# Patient Record
Sex: FEMALE | Race: WHITE | NOT HISPANIC OR LATINO | Employment: UNEMPLOYED | URBAN - METROPOLITAN AREA
[De-identification: names, ages, dates, MRNs, and addresses within clinical notes are randomized per-mention and may not be internally consistent; named-entity substitution may affect disease eponyms.]

---

## 2018-09-20 ENCOUNTER — APPOINTMENT (OUTPATIENT)
Dept: WOMENS IMAGING | Facility: HOSPITAL | Age: 43
End: 2018-09-20

## 2018-09-20 PROCEDURE — 77067 SCR MAMMO BI INCL CAD: CPT | Performed by: RADIOLOGY

## 2022-05-06 ENCOUNTER — OFFICE VISIT (OUTPATIENT)
Dept: FAMILY MEDICINE CLINIC | Facility: CLINIC | Age: 47
End: 2022-05-06

## 2022-05-06 VITALS
SYSTOLIC BLOOD PRESSURE: 122 MMHG | WEIGHT: 188.5 LBS | DIASTOLIC BLOOD PRESSURE: 80 MMHG | BODY MASS INDEX: 31.4 KG/M2 | HEART RATE: 65 BPM | HEIGHT: 65 IN | TEMPERATURE: 97.3 F | OXYGEN SATURATION: 99 %

## 2022-05-06 DIAGNOSIS — F33.2 SEVERE EPISODE OF RECURRENT MAJOR DEPRESSIVE DISORDER, WITHOUT PSYCHOTIC FEATURES: Primary | ICD-10-CM

## 2022-05-06 PROBLEM — R10.9 ABDOMINAL PAIN: Status: ACTIVE | Noted: 2022-05-06

## 2022-05-06 PROBLEM — E55.9 AVITAMINOSIS D: Status: ACTIVE | Noted: 2022-05-06

## 2022-05-06 PROBLEM — F41.9 ANXIETY AND DEPRESSION: Status: ACTIVE | Noted: 2022-05-06

## 2022-05-06 PROBLEM — F32.A ANXIETY AND DEPRESSION: Status: ACTIVE | Noted: 2022-05-06

## 2022-05-06 PROBLEM — D50.9 ANEMIA, IRON DEFICIENCY: Status: ACTIVE | Noted: 2022-05-06

## 2022-05-06 PROBLEM — H57.02 ANISOCORIA: Status: ACTIVE | Noted: 2022-05-06

## 2022-05-06 PROBLEM — E66.9 ADIPOSITY: Status: ACTIVE | Noted: 2022-05-06

## 2022-05-06 PROCEDURE — 99214 OFFICE O/P EST MOD 30 MIN: CPT | Performed by: FAMILY MEDICINE

## 2022-05-06 RX ORDER — BUPROPION HYDROCHLORIDE 150 MG/1
150 TABLET ORAL DAILY
COMMUNITY
Start: 2022-02-03 | End: 2022-05-06 | Stop reason: SDUPTHER

## 2022-05-06 RX ORDER — DULOXETIN HYDROCHLORIDE 20 MG/1
20 CAPSULE, DELAYED RELEASE ORAL DAILY
Qty: 30 CAPSULE | Refills: 5 | Status: SHIPPED | OUTPATIENT
Start: 2022-05-06 | End: 2022-05-27

## 2022-05-06 RX ORDER — DESVENLAFAXINE SUCCINATE 50 MG/1
1 TABLET, EXTENDED RELEASE ORAL DAILY
COMMUNITY
Start: 2022-02-16 | End: 2022-05-06

## 2022-05-06 RX ORDER — DESVENLAFAXINE 25 MG/1
25 TABLET, EXTENDED RELEASE ORAL DAILY
Qty: 10 TABLET | Refills: 0 | Status: SHIPPED | OUTPATIENT
Start: 2022-05-06 | End: 2022-05-27

## 2022-05-06 RX ORDER — BUPROPION HYDROCHLORIDE 150 MG/1
150 TABLET ORAL EVERY MORNING
Qty: 90 TABLET | Refills: 1 | Status: SHIPPED | OUTPATIENT
Start: 2022-05-06 | End: 2022-05-27

## 2022-05-06 NOTE — ASSESSMENT & PLAN NOTE
Patient's depression is recurrent and is severe without psychosis. Their depression is currently active and the condition is worsening. This will be reassessed In 3 weeks. F/U as described:Patient was instructed how to taper off Pristiq and start Cymbalta 20 mg daily.  We did discuss potentially increasing Wellbutrin but we will follow-up in 3 weeks and decide at that time..

## 2022-05-06 NOTE — PROGRESS NOTES
Date: 2022   Patient Name: Latoya Gibson  : 1975   MRN: [id number]     Chief Complaint:    Chief Complaint   Patient presents with   • Anxiety     Patient would like to discuss medication changes, patient recently in  had a suicidal attempt, patient states the current meds are not helping or strong enough       History of Present Illness: Latoya Gibson is a 46 y.o. female who is here today to follow up for Depression.  She reports that she feels like her medications are not working well.  She went off her medicines cold turkey in January and this resulted in a suicide attempt and hospitalization at [facility].  She states that she did well when she was on Cymbalta however had weight gain with that.  She would like to discuss going back on Cymbalta as her mental health is more important than the side effects.  She denies suicidal ideation or intent today.           Review of Systems:   Review of Systems   Constitutional: Negative for chills, fatigue and fever.   Respiratory: Negative for cough and shortness of breath.    Cardiovascular: Negative for chest pain and palpitations.   Gastrointestinal: Negative for abdominal pain, constipation, diarrhea, nausea and vomiting.   Musculoskeletal: Negative for back pain and myalgias.   Neurological: Negative for dizziness and headache.   Psychiatric/Behavioral: Positive for self-injury, depressed mood and stress. The patient is nervous/anxious.        Past Medical History:   Past Medical History:   Diagnosis Date   • Anxiety    • Dysmenorrhea    • Menorrhagia 2015   • Menorrhagia    • Neurotic depression    • Suicidal behavior with attempted self-injury (HCC)        Past Surgical History:   Past Surgical History:   Procedure Laterality Date   • D & C HYSTEROSCOPY ENDOMETRIAL ABLATION  2015    hysterscopy Dand C ablation nova sure   • LAPAROSCOPIC EXPLORATION FOR ECTOPIC PREGNANCY  2008    ruptured ectopic left distal tube and  "evacuation of hemoperitoneum   • TUBAL ABDOMINAL LIGATION         Family History: History reviewed. No pertinent family history.    Social History:   Social History     Socioeconomic History   • Marital status:      Spouse name: Jose   • Number of children: 2   Tobacco Use   • Smoking status: Former Smoker     Packs/day: 0.50     Years: 10.00     Pack years: 5.00     Types: Cigarettes     Quit date: 2000     Years since quittin.3   • Smokeless tobacco: Never Used   Vaping Use   • Vaping Use: Never used   Substance and Sexual Activity   • Alcohol use: Yes     Comment: soc   • Drug use: No   • Sexual activity: Yes     Partners: Male       Medications:     Current Outpatient Medications:   •  buPROPion XL (WELLBUTRIN XL) 150 MG 24 hr tablet, Take 1 tablet by mouth Every Morning., Disp: 90 tablet, Rfl: 1  •  Desvenlafaxine Succinate ER 25 MG tablet sustained-release 24 hour, Take 1 tablet by mouth Daily., Disp: 10 tablet, Rfl: 0  •  DULoxetine (CYMBALTA) 20 MG capsule, Take 1 capsule by mouth Daily., Disp: 30 capsule, Rfl: 5    Allergies:   No Known Allergies    PHQ-2 Total Score: 18   PHQ-9 Total Score: 18     Physical Exam:  Vital Signs:   Vitals:    22 1129   BP: 122/80   BP Location: Left arm   Patient Position: Sitting   Cuff Size: Adult   Pulse: 65   Temp: 97.3 °F (36.3 °C)   TempSrc: Temporal   SpO2: 99%   Weight: 85.5 kg (188 lb 8 oz)   Height: 165.1 cm (65\")     Body mass index is 31.37 kg/m².     Physical Exam  Vitals and nursing note reviewed.   Constitutional:       Appearance: Normal appearance. She is normal weight.   HENT:      Head: Normocephalic and atraumatic.   Eyes:      Conjunctiva/sclera: Conjunctivae normal.      Pupils: Pupils are equal, round, and reactive to light.   Cardiovascular:      Rate and Rhythm: Normal rate and regular rhythm.      Heart sounds: Normal heart sounds. No murmur heard.  Pulmonary:      Effort: Pulmonary effort is normal.      Breath sounds: Normal " breath sounds. No wheezing.   Abdominal:      General: Bowel sounds are normal.      Palpations: Abdomen is soft.   Musculoskeletal:      Cervical back: Normal range of motion.   Lymphadenopathy:      Cervical: No cervical adenopathy.   Skin:     General: Skin is warm.   Neurological:      Mental Status: She is alert and oriented to person, place, and time. Mental status is at baseline.   Psychiatric:         Mood and Affect: Mood normal.         Behavior: Behavior normal.           Assessment/Plan:   Diagnoses and all orders for this visit:    1. Severe episode of recurrent major depressive disorder, without psychotic features (HCC) (Primary)  Assessment & Plan:  Patient's depression is recurrent and is severe without psychosis. Their depression is currently active and the condition is worsening. This will be reassessed In 3 weeks. F/U as described:Patient was instructed how to taper off Pristiq and start Cymbalta 20 mg daily.  We did discuss potentially increasing Wellbutrin but we will follow-up in 3 weeks and decide at that time..    Orders:  -     Desvenlafaxine Succinate ER 25 MG tablet sustained-release 24 hour; Take 1 tablet by mouth Daily.  Dispense: 10 tablet; Refill: 0  -     DULoxetine (CYMBALTA) 20 MG capsule; Take 1 capsule by mouth Daily.  Dispense: 30 capsule; Refill: 5  -     buPROPion XL (WELLBUTRIN XL) 150 MG 24 hr tablet; Take 1 tablet by mouth Every Morning.  Dispense: 90 tablet; Refill: 1         Follow Up:   Return in about 3 weeks (around 5/27/2022) for Med Recheck.    Nicole Simmons DO  [facility]

## 2022-05-09 RX ORDER — HYDROXYZINE 50 MG/1
50 TABLET, FILM COATED ORAL 3 TIMES DAILY PRN
Qty: 90 TABLET | Refills: 2 | Status: SHIPPED | OUTPATIENT
Start: 2022-05-09 | End: 2022-08-23

## 2022-05-27 ENCOUNTER — OFFICE VISIT (OUTPATIENT)
Dept: FAMILY MEDICINE CLINIC | Facility: CLINIC | Age: 47
End: 2022-05-27

## 2022-05-27 VITALS
HEIGHT: 65 IN | HEART RATE: 77 BPM | TEMPERATURE: 97.3 F | OXYGEN SATURATION: 99 % | DIASTOLIC BLOOD PRESSURE: 78 MMHG | BODY MASS INDEX: 31.17 KG/M2 | WEIGHT: 187.1 LBS | SYSTOLIC BLOOD PRESSURE: 130 MMHG

## 2022-05-27 DIAGNOSIS — F33.2 SEVERE EPISODE OF RECURRENT MAJOR DEPRESSIVE DISORDER, WITHOUT PSYCHOTIC FEATURES: Primary | ICD-10-CM

## 2022-05-27 PROCEDURE — 99214 OFFICE O/P EST MOD 30 MIN: CPT | Performed by: FAMILY MEDICINE

## 2022-05-27 RX ORDER — DULOXETIN HYDROCHLORIDE 20 MG/1
60 CAPSULE, DELAYED RELEASE ORAL DAILY
Qty: 270 CAPSULE | Refills: 1 | Status: SHIPPED | OUTPATIENT
Start: 2022-05-27 | End: 2022-08-18

## 2022-05-27 RX ORDER — BUPROPION HYDROCHLORIDE 300 MG/1
300 TABLET ORAL EVERY MORNING
Qty: 90 TABLET | Refills: 1 | Status: SHIPPED | OUTPATIENT
Start: 2022-05-27 | End: 2022-08-18 | Stop reason: SDUPTHER

## 2022-05-27 NOTE — PROGRESS NOTES
Date: 2022   Patient Name: Latoya Gibson  : 1975   MRN: [id number]     Chief Complaint:    Chief Complaint   Patient presents with   • Depression     Follow up on medication, patient states she has seen no improvement        History of Present Illness: Latoya Gibson is a 46 y.o. female who is here today to follow up for Depression.  She was tapered off Pristiq and started on Cymbalta 20 mg at the last appointment.  She denies side effects from the taper but does not feel any improvement in her depression symptoms.  She would like to discuss increasing the dose of both Cymbalta and Wellbutrin today.           Review of Systems:   Review of Systems   Constitutional: Negative for chills, fatigue and fever.   Respiratory: Negative for cough and shortness of breath.    Cardiovascular: Negative for chest pain and palpitations.   Gastrointestinal: Negative for abdominal pain, constipation, diarrhea, nausea and vomiting.   Musculoskeletal: Negative for back pain and myalgias.   Neurological: Negative for dizziness and headache.   Psychiatric/Behavioral: Positive for decreased concentration, sleep disturbance, depressed mood and stress. The patient is nervous/anxious.        Past Medical History:   Past Medical History:   Diagnosis Date   • Anxiety    • Dysmenorrhea    • Menorrhagia 2015   • Menorrhagia    • Neurotic depression    • Suicidal behavior with attempted self-injury (HCC)        Past Surgical History:   Past Surgical History:   Procedure Laterality Date   • D & C HYSTEROSCOPY ENDOMETRIAL ABLATION  2015    hysterscopy Dand C ablation nova sure   • LAPAROSCOPIC EXPLORATION FOR ECTOPIC PREGNANCY  2008    ruptured ectopic left distal tube and evacuation of hemoperitoneum   • TUBAL ABDOMINAL LIGATION         Family History: History reviewed. No pertinent family history.    Social History:   Social History     Socioeconomic History   • Marital status:      Spouse name:  "Jose   • Number of children: 2   Tobacco Use   • Smoking status: Former Smoker     Packs/day: 0.50     Years: 10.00     Pack years: 5.00     Types: Cigarettes     Quit date:      Years since quittin.4   • Smokeless tobacco: Never Used   Vaping Use   • Vaping Use: Never used   Substance and Sexual Activity   • Alcohol use: Yes     Comment: soc   • Drug use: No   • Sexual activity: Yes     Partners: Male       Medications:     Current Outpatient Medications:   •  hydrOXYzine (ATARAX) 50 MG tablet, Take 1 tablet by mouth 3 (Three) Times a Day As Needed for Anxiety., Disp: 90 tablet, Rfl: 2  •  buPROPion XL (Wellbutrin XL) 300 MG 24 hr tablet, Take 1 tablet by mouth Every Morning., Disp: 90 tablet, Rfl: 1  •  DULoxetine (CYMBALTA) 20 MG capsule, Take 3 capsules by mouth Daily., Disp: 270 capsule, Rfl: 1    Allergies:   No Known Allergies    PHQ-2 Total Score: 12   PHQ-9 Total Score: 12     Physical Exam:  Vital Signs:   Vitals:    22 1432   BP: 130/78   BP Location: Left arm   Patient Position: Sitting   Cuff Size: Adult   Pulse: 77   Temp: 97.3 °F (36.3 °C)   TempSrc: Temporal   SpO2: 99%   Weight: 84.9 kg (187 lb 1.6 oz)   Height: 165.1 cm (65\")     Body mass index is 31.14 kg/m².     Physical Exam  Vitals and nursing note reviewed.   Constitutional:       Appearance: Normal appearance.   HENT:      Head: Normocephalic.   Cardiovascular:      Rate and Rhythm: Normal rate and regular rhythm.      Heart sounds: Normal heart sounds. No murmur heard.  Pulmonary:      Effort: Pulmonary effort is normal.      Breath sounds: Normal breath sounds. No wheezing.   Neurological:      Mental Status: She is alert and oriented to person, place, and time.   Psychiatric:         Mood and Affect: Mood is depressed. Affect is flat.         Behavior: Behavior normal.           Assessment/Plan:   Diagnoses and all orders for this visit:    1. Severe episode of recurrent major depressive disorder, without psychotic " features (HCC) (Primary)  Assessment & Plan:  Patient's depression is recurrent and is severe without psychosis. Their depression is currently active and the condition is unchanged. This will be reassessed In 3 weeks. F/U as described:Patient will increase Cymbalta to 40 mg for 1 week and then may go up to 60 mg daily.  We will also increase Wellbutrin to 300 mg daily.  Side effects discussed.  Patient will call if any worsening of symptoms.    Orders:  -     DULoxetine (CYMBALTA) 20 MG capsule; Take 3 capsules by mouth Daily.  Dispense: 270 capsule; Refill: 1  -     buPROPion XL (Wellbutrin XL) 300 MG 24 hr tablet; Take 1 tablet by mouth Every Morning.  Dispense: 90 tablet; Refill: 1         Follow Up:   Return in about 3 weeks (around 6/17/2022) for Med Recheck.    Nicole Simmons DO  [facility]

## 2022-05-27 NOTE — ASSESSMENT & PLAN NOTE
Patient's depression is recurrent and is severe without psychosis. Their depression is currently active and the condition is unchanged. This will be reassessed In 3 weeks. F/U as described:Patient will increase Cymbalta to 40 mg for 1 week and then may go up to 60 mg daily.  We will also increase Wellbutrin to 300 mg daily.  Side effects discussed.  Patient will call if any worsening of symptoms.

## 2022-07-15 ENCOUNTER — OFFICE VISIT (OUTPATIENT)
Dept: FAMILY MEDICINE CLINIC | Facility: CLINIC | Age: 47
End: 2022-07-15

## 2022-07-15 ENCOUNTER — TELEPHONE (OUTPATIENT)
Dept: FAMILY MEDICINE CLINIC | Facility: CLINIC | Age: 47
End: 2022-07-15

## 2022-07-15 VITALS
OXYGEN SATURATION: 98 % | DIASTOLIC BLOOD PRESSURE: 84 MMHG | WEIGHT: 174.8 LBS | BODY MASS INDEX: 29.12 KG/M2 | HEIGHT: 65 IN | SYSTOLIC BLOOD PRESSURE: 118 MMHG | HEART RATE: 71 BPM

## 2022-07-15 DIAGNOSIS — F39 MOOD DISORDER: ICD-10-CM

## 2022-07-15 DIAGNOSIS — F33.2 SEVERE EPISODE OF RECURRENT MAJOR DEPRESSIVE DISORDER, WITHOUT PSYCHOTIC FEATURES: Primary | ICD-10-CM

## 2022-07-15 PROCEDURE — 99213 OFFICE O/P EST LOW 20 MIN: CPT | Performed by: PHYSICIAN ASSISTANT

## 2022-07-15 NOTE — PROGRESS NOTES
"Chief Complaint  Depression    Subjective          Latoya Gibson presents to [facility]  History of Present Illness patient continues to experience issues with depression does not feel like increasing the duloxetine and bupropion has been successful in controlling her symptoms.  She still has crying spells and feels like she needs something else.  She also has expressed concern that she was seeing different providers each time she came here and she would like to get back in to see Dr. Sanchez.  She also thinks she might be having menopausal symptoms because she has had hot flashes along with her increasing depression symptoms and she has read that this can be related to menopause.      Objective   Vital Signs:   /84   Pulse 71   Ht 165.1 cm (65\")   Wt 79.3 kg (174 lb 12.8 oz)   SpO2 98%   BMI 29.09 kg/m²     Body mass index is 29.09 kg/m².    Review of Systems   Constitutional: Negative for chills, fatigue and fever.   Respiratory: Negative for cough and shortness of breath.    Cardiovascular: Negative for chest pain and palpitations.   Musculoskeletal: Negative for back pain and myalgias.   Neurological: Negative for dizziness and headache.   Psychiatric/Behavioral: Positive for dysphoric mood, sleep disturbance, depressed mood and stress. Negative for self-injury and suicidal ideas. The patient is nervous/anxious.        Past History:  Medical History: has a past medical history of Anxiety, Dysmenorrhea, Menorrhagia (04/30/2015), Menorrhagia, Neurotic depression, and Suicidal behavior with attempted self-injury (HCC).   Surgical History: has a past surgical history that includes Tubal ligation (1997); d & c hysteroscopy endometrial ablation (04/30/2015); and laparoscopic exploration for ectopic pregnancy (04/21/2008).   Family History: family history is not on file.   Social History: reports that she quit smoking about 22 years ago. Her smoking use included cigarettes. She " has a 5.00 pack-year smoking history. She has never used smokeless tobacco. She reports current alcohol use. She reports that she does not use drugs.      Current Outpatient Medications:   •  buPROPion XL (Wellbutrin XL) 300 MG 24 hr tablet, Take 1 tablet by mouth Every Morning., Disp: 90 tablet, Rfl: 1  •  DULoxetine (CYMBALTA) 20 MG capsule, Take 3 capsules by mouth Daily., Disp: 270 capsule, Rfl: 1  •  hydrOXYzine (ATARAX) 50 MG tablet, Take 1 tablet by mouth 3 (Three) Times a Day As Needed for Anxiety., Disp: 90 tablet, Rfl: 2  •  Cariprazine HCl (Vraylar) 3 MG capsule capsule, Take 1 capsule by mouth Daily., Disp: 30 capsule, Rfl: 1  Allergies: Patient has no known allergies.    Physical Exam  Constitutional:       Appearance: Normal appearance.   HENT:      Head: Normocephalic.   Cardiovascular:      Rate and Rhythm: Normal rate and regular rhythm.   Pulmonary:      Effort: Pulmonary effort is normal.      Breath sounds: Normal breath sounds.   Neurological:      General: No focal deficit present.      Mental Status: She is alert and oriented to person, place, and time.   Psychiatric:         Mood and Affect: Mood is depressed. Mood is not anxious. Affect is flat. Affect is not tearful.         Speech: Speech normal.         Behavior: Behavior normal.         Thought Content: Thought content normal.         Cognition and Memory: Cognition normal.         Judgment: Judgment normal.             Assessment and Plan   Diagnoses and all orders for this visit:    1. Severe episode of recurrent major depressive disorder, without psychotic features (HCC) (Primary)  Assessment & Plan:  She apparently has a long history of depression including previous suicide attempts though she states that she is not suicidal at this moment.  I am going to add Vraylar I have given her samples of 1.5 mg to take for the first 2 weeks and then go up to 3 mg and I will have her follow-up with Dr. Sanchez who is her regular primary care  provider and whom she wants to see.  I have advised her to continue the Cymbalta and the Wellbutrin for now.  She was advised to seek emergency care if she ever felt suicidal again.      Other orders  -     Discontinue: Cariprazine HCl (Vraylar) 1.5 MG capsule capsule; Take 1 capsule by mouth Daily.  Dispense: 30 capsule; Refill: 1  -     Cariprazine HCl (Vraylar) 3 MG capsule capsule; Take 1 capsule by mouth Daily.  Dispense: 30 capsule; Refill: 1          Follow Up   Return in about 4 weeks (around 8/12/2022) for Recheck WITH DR KAHN.  Patient was given instructions and counseling regarding her condition or for health maintenance advice. Please see specific information pulled into the AVS if appropriate.     Mary Kay Joel PA-C

## 2022-07-15 NOTE — TELEPHONE ENCOUNTER
Lanny, can you attach the ICD-10 code on the sig of the RX and resend this please? It wouldn't let me edit it

## 2022-07-15 NOTE — ASSESSMENT & PLAN NOTE
She apparently has a long history of depression including previous suicide attempts though she states that she is not suicidal at this moment.  I am going to add Vraylar I have given her samples of 1.5 mg to take for the first 2 weeks and then go up to 3 mg and I will have her follow-up with Dr. Sanchez who is her regular primary care provider and whom she wants to see.  I have advised her to continue the Cymbalta and the Wellbutrin for now.  She was advised to seek emergency care if she ever felt suicidal again.

## 2022-07-22 ENCOUNTER — TELEPHONE (OUTPATIENT)
Dept: FAMILY MEDICINE CLINIC | Facility: CLINIC | Age: 47
End: 2022-07-22

## 2022-07-22 NOTE — TELEPHONE ENCOUNTER
Caller: Latoya Gibson    Relationship to patient: Self    Best call back number: [phone]    Patient is needing: PATIENT STATES THAT SHE NEEDS THE DR TO WRITE HER A NOTE STATING THAT SHE CANNOT WORK BECAUSE OF HER HEALTH IN ORDER TO GET HELP WITH HER ELECTRICITY BILL.

## 2022-07-25 NOTE — TELEPHONE ENCOUNTER
Please see previous messages,I have not seen her you have seen her most recently she is requesting a note stating she is unable to work so that her utility bill can be paid

## 2022-07-25 NOTE — TELEPHONE ENCOUNTER
I have not given her a note to be off work, and that was not discussed at her recent appointment with me or Dr. Simmons whom she also has seen.  She will need to schedule an appointment and discuss this or discuss it the next time she is seen in the office.

## 2022-07-26 ENCOUNTER — TELEPHONE (OUTPATIENT)
Dept: FAMILY MEDICINE CLINIC | Facility: CLINIC | Age: 47
End: 2022-07-26

## 2022-07-28 ENCOUNTER — OFFICE VISIT (OUTPATIENT)
Dept: FAMILY MEDICINE CLINIC | Facility: CLINIC | Age: 47
End: 2022-07-28

## 2022-07-28 VITALS
WEIGHT: 179 LBS | OXYGEN SATURATION: 98 % | BODY MASS INDEX: 29.82 KG/M2 | HEIGHT: 65 IN | SYSTOLIC BLOOD PRESSURE: 120 MMHG | DIASTOLIC BLOOD PRESSURE: 74 MMHG | HEART RATE: 68 BPM

## 2022-07-28 DIAGNOSIS — F33.2 SEVERE EPISODE OF RECURRENT MAJOR DEPRESSIVE DISORDER, WITHOUT PSYCHOTIC FEATURES: Primary | ICD-10-CM

## 2022-07-28 PROCEDURE — 99212 OFFICE O/P EST SF 10 MIN: CPT | Performed by: PHYSICIAN ASSISTANT

## 2022-07-28 NOTE — PROGRESS NOTES
"Chief Complaint  wants letter for electric bill (Pt is here today requesting a letter stating that she is unable to work due to anxiety and depression so that the [employer] will pay her electric bill.)    Subjective          Latoya Gibson presents to [facility]  History of Present Illness patient is here stating that she needs a letter to get to the [employer] so they will continue to pay for her electric bill while she is unemployed.  She states that she is unemployed and simply cannot work because of her anxiety and depression.  When asked specifically what prevents her from going to work she just says \"I would go to work if I could.\"  She also states that her  is unable to work because of back issues.    Objective   Vital Signs:   /74   Pulse 68   Ht 165.1 cm (65\")   Wt 81.2 kg (179 lb)   SpO2 98%   BMI 29.79 kg/m²     Body mass index is 29.79 kg/m².    Review of Systems   Constitutional: Negative for chills, fatigue and fever.   Respiratory: Negative for cough and shortness of breath.    Cardiovascular: Negative for chest pain and palpitations.   Musculoskeletal: Negative for back pain and myalgias.   Neurological: Negative for dizziness and headache.   Psychiatric/Behavioral: Positive for depressed mood. The patient is nervous/anxious.          Past History:  Medical History: has a past medical history of Anxiety, Dysmenorrhea, Menorrhagia (04/30/2015), Menorrhagia, Neurotic depression, and Suicidal behavior with attempted self-injury (HCC).   Surgical History: has a past surgical history that includes Tubal ligation (1997); d & c hysteroscopy endometrial ablation (04/30/2015); and laparoscopic exploration for ectopic pregnancy (04/21/2008).   Family History: family history is not on file.   Social History: reports that she quit smoking about 22 years ago. Her smoking use included cigarettes. She has a 7.50 pack-year smoking history. She has never used " "smokeless tobacco. She reports current alcohol use. She reports that she does not use drugs.      Current Outpatient Medications:   •  buPROPion XL (Wellbutrin XL) 300 MG 24 hr tablet, Take 1 tablet by mouth Every Morning., Disp: 90 tablet, Rfl: 1  •  Cariprazine HCl (Vraylar) 3 MG capsule capsule, Take 1 capsule by mouth Daily. For mood disorder F39, Disp: 30 capsule, Rfl: 5  •  DULoxetine (CYMBALTA) 20 MG capsule, Take 3 capsules by mouth Daily., Disp: 270 capsule, Rfl: 1  •  hydrOXYzine (ATARAX) 50 MG tablet, Take 1 tablet by mouth 3 (Three) Times a Day As Needed for Anxiety., Disp: 90 tablet, Rfl: 2    Allergies: Patient has no known allergies.    Physical Exam  Constitutional:       Appearance: Normal appearance.   HENT:      Head: Normocephalic.   Cardiovascular:      Rate and Rhythm: Normal rate and regular rhythm.   Pulmonary:      Effort: Pulmonary effort is normal.      Breath sounds: Normal breath sounds.   Neurological:      General: No focal deficit present.      Mental Status: She is alert and oriented to person, place, and time.   Psychiatric:         Mood and Affect: Mood normal.         Behavior: Behavior normal.          Result Review :                   Assessment and Plan    Diagnoses and all orders for this visit:    1. Severe episode of recurrent major depressive disorder, without psychotic features (HCC) (Primary)  Assessment & Plan:  Patient was specifically here requesting a letter stating that she could not work so that she can get her electric bill paid for.  I told her that I was not comfortable giving her that letter and that she is going to see her psychiatrist tomorrow I suggested perhaps he would be a better person to determine whether or not she is able to work.  I felt like she would feel better if she went back to work.  She did not give me an adequate reason for not being able to work she just kept saying \"I would go back to work if I could.\"        Other orders  -     Cariprazine " HCl (Vraylar) 3 MG capsule capsule; Take 1 capsule by mouth Daily. For mood disorder F39  Dispense: 30 capsule; Refill: 5      Follow Up   No follow-ups on file.  Patient was given instructions and counseling regarding her condition or for health maintenance advice. Please see specific information pulled into the AVS if appropriate.     Mary Kay Joel PA-C

## 2022-07-28 NOTE — ASSESSMENT & PLAN NOTE
"Patient was specifically here requesting a letter stating that she could not work so that she can get her electric bill paid for.  I told her that I was not comfortable giving her that letter and that she is going to see her psychiatrist tomorrow I suggested perhaps he would be a better person to determine whether or not she is able to work.  I felt like she would feel better if she went back to work.  She did not give me an adequate reason for not being able to work she just kept saying \"I would go back to work if I could.\"    "

## 2022-08-18 ENCOUNTER — TELEMEDICINE (OUTPATIENT)
Dept: FAMILY MEDICINE CLINIC | Facility: CLINIC | Age: 47
End: 2022-08-18

## 2022-08-18 VITALS — WEIGHT: 176 LBS | HEIGHT: 65 IN | BODY MASS INDEX: 29.32 KG/M2

## 2022-08-18 DIAGNOSIS — F39 MOOD DISORDER: ICD-10-CM

## 2022-08-18 DIAGNOSIS — F41.1 GENERALIZED ANXIETY DISORDER WITH PANIC ATTACKS: ICD-10-CM

## 2022-08-18 DIAGNOSIS — F41.0 GENERALIZED ANXIETY DISORDER WITH PANIC ATTACKS: ICD-10-CM

## 2022-08-18 DIAGNOSIS — F33.2 SEVERE EPISODE OF RECURRENT MAJOR DEPRESSIVE DISORDER, WITHOUT PSYCHOTIC FEATURES: Primary | ICD-10-CM

## 2022-08-18 PROCEDURE — 99214 OFFICE O/P EST MOD 30 MIN: CPT | Performed by: FAMILY MEDICINE

## 2022-08-18 RX ORDER — BUPROPION HYDROCHLORIDE 300 MG/1
300 TABLET ORAL EVERY MORNING
Qty: 90 TABLET | Refills: 1 | Status: SHIPPED | OUTPATIENT
Start: 2022-08-18

## 2022-08-18 RX ORDER — BUPROPION HYDROCHLORIDE 150 MG/1
150 TABLET ORAL DAILY
Qty: 90 TABLET | Refills: 1 | Status: SHIPPED | OUTPATIENT
Start: 2022-08-18

## 2022-08-18 RX ORDER — BUSPIRONE HYDROCHLORIDE 10 MG/1
TABLET ORAL
Qty: 90 TABLET | Refills: 5 | Status: SHIPPED | OUTPATIENT
Start: 2022-08-18

## 2022-08-18 NOTE — ASSESSMENT & PLAN NOTE
Psychological condition is unchanged.  Medication changes per orders.  Psychological condition  will be reassessed at the next regular appointment.    See above added BuSpar

## 2022-08-18 NOTE — ASSESSMENT & PLAN NOTE
Psychological condition is unchanged.  Medication changes per orders.  Referral to psychiatry.  Psychological condition  will be reassessed at the next regular appointment.

## 2022-08-18 NOTE — ASSESSMENT & PLAN NOTE
Patient's depression is recurrent and is moderate without psychosis. Their depression is currently active and the condition is unchanged. This will be reassessed at the next regular appointment. F/U as described:patient will continue current medication therapy and patient referred to Mental Health Specialist     Discussed treatment options and she has a follow-up scheduled with psychiatry in the next 10 days.    She would like to try going up on Wellbutrin and we will go up to 450 mg.  No side effects with current 300 mg Wellbutrin dosing.  She is tapering down on her Vraylar and considering other mood stabilizer options with psychiatry.    Discussed anxiety flareups and she has failed numerous SSRI and SNRI treatments.  Interested in trial of BuSpar.  Start at 5 mg up to 3 times daily and may go up to 10 mg 3 times daily after 1 week.    Close follow-up if no improvement or worsens.    Her psychiatrist did complete paperwork indicating she cannot work at this time due to her mental health condition.  Encouraged counseling.

## 2022-08-18 NOTE — PROGRESS NOTES
Patient was seen today through synchronous audio/video technology. Verbal consent was obtained. The patient was located at home. Vitals signs were not obtained due to lack of home monitoring access.       Chief Complaint  Here given problems with anxiety and depression.  Following also with psychiatry.      History of Present Illness  Latoya Gibson is a 46 y.o. female presenting via video-conference today for follow-up regarding severe anxiety with depression.    She continues to have problems with severe depression with anxiety and panic episodes and agoraphobia.  She has followed with psychiatry is written a letter stating she cannot return to work at this time due to her mental health condition.  No suicidal ideation.    She was taken off Cymbalta by psychiatry and they decreased her Vraylar from 3 mg down to 1.5 mg.  She is on Wellbutrin 300 mg in the morning and is interested in trying to go up to 450 mg of Wellbutrin.  Hydroxyzine works as needed for anxiety but still having more anxiety issues.  She is failed other SSRI and SSNRIs.  Interested in trial adding BuSpar.  She has a follow-up with her psychiatrist scheduled in 10 days    The following portions of the patient's history were reviewed and updated as appropriate: allergies, current medications, past family history, past medical history, past social history, past surgical history and problem list.    Review of Systems   Constitutional: Negative for appetite change, chills, fever and unexpected weight change.   HENT: Negative for hearing loss.    Eyes: Negative for visual disturbance.   Respiratory: Negative for chest tightness, shortness of breath and wheezing.    Cardiovascular: Negative for chest pain, palpitations and leg swelling.   Gastrointestinal: Negative for abdominal pain.   Musculoskeletal: Negative for arthralgias, back pain and gait problem.   Skin: Negative for rash.   Neurological: Negative for dizziness and headaches.  "  Psychiatric/Behavioral: Negative for agitation, confusion, self-injury, sleep disturbance and suicidal ideas. The patient is nervous/anxious.         Depression       Objective  Ht 165.1 cm (65\")   Wt 79.8 kg (176 lb)   BMI 29.29 kg/m²     Physical Exam  Constitutional:       General: She is not in acute distress.     Appearance: Normal appearance. She is not ill-appearing.   HENT:      Head: Normocephalic and atraumatic.      Right Ear: External ear normal.      Left Ear: External ear normal.      Nose: Nose normal.   Eyes:      Extraocular Movements: Extraocular movements intact.      Conjunctiva/sclera: Conjunctivae normal.      Pupils: Pupils are equal, round, and reactive to light.   Pulmonary:      Effort: Pulmonary effort is normal. No respiratory distress.   Musculoskeletal:         General: Normal range of motion.      Cervical back: Normal range of motion.   Skin:     Findings: No rash.   Neurological:      General: No focal deficit present.      Mental Status: She is alert and oriented to person, place, and time.   Psychiatric:         Attention and Perception: Attention and perception normal.         Mood and Affect: Mood is anxious and depressed.         Speech: Speech normal.         Behavior: Behavior normal. Behavior is cooperative.         Thought Content: Thought content normal. Thought content does not include homicidal or suicidal plan.         Cognition and Memory: Cognition and memory normal.         Judgment: Judgment normal.           Assessment/Plan   Diagnoses and all orders for this visit:    1. Severe episode of recurrent major depressive disorder, without psychotic features (HCC) (Primary)  Assessment & Plan:  Patient's depression is recurrent and is moderate without psychosis. Their depression is currently active and the condition is unchanged. This will be reassessed at the next regular appointment. F/U as described:patient will continue current medication therapy and patient " referred to Mental Health Specialist     Discussed treatment options and she has a follow-up scheduled with psychiatry in the next 10 days.    She would like to try going up on Wellbutrin and we will go up to 450 mg.  No side effects with current 300 mg Wellbutrin dosing.  She is tapering down on her Vraylar and considering other mood stabilizer options with psychiatry.    Discussed anxiety flareups and she has failed numerous SSRI and SNRI treatments.  Interested in trial of BuSpar.  Start at 5 mg up to 3 times daily and may go up to 10 mg 3 times daily after 1 week.    Close follow-up if no improvement or worsens.    Her psychiatrist did complete paperwork indicating she cannot work at this time due to her mental health condition.  Encouraged counseling.      2. Mood disorder (HCC)  Assessment & Plan:  Psychological condition is unchanged.  Medication changes per orders.  Referral to psychiatry.  Psychological condition  will be reassessed at the next regular appointment.      3. Generalized anxiety disorder with panic attacks  Assessment & Plan:  Psychological condition is unchanged.  Medication changes per orders.  Psychological condition  will be reassessed at the next regular appointment.    See above added BuSpar        Return in about 3 months (around 11/18/2022) for Med recheck.    Killian Sanchez MD

## 2022-08-23 RX ORDER — HYDROXYZINE 50 MG/1
TABLET, FILM COATED ORAL
Qty: 90 TABLET | Refills: 0 | Status: SHIPPED | OUTPATIENT
Start: 2022-08-23

## 2023-02-09 ENCOUNTER — TELEMEDICINE (OUTPATIENT)
Dept: FAMILY MEDICINE CLINIC | Facility: CLINIC | Age: 48
End: 2023-02-09
Payer: MEDICAID

## 2023-02-09 NOTE — PROGRESS NOTES
Unable to reach patient for scheduled telehealth appointment.  Attempted to reach patient via phone, [email or web address], and my chart  This encounter was created in error - please disregard.

## 2023-06-15 ENCOUNTER — OFFICE VISIT (OUTPATIENT)
Dept: FAMILY MEDICINE CLINIC | Facility: CLINIC | Age: 48
End: 2023-06-15
Payer: MEDICAID

## 2023-06-15 VITALS
HEART RATE: 65 BPM | SYSTOLIC BLOOD PRESSURE: 124 MMHG | WEIGHT: 149 LBS | OXYGEN SATURATION: 98 % | BODY MASS INDEX: 24.83 KG/M2 | HEIGHT: 65 IN | DIASTOLIC BLOOD PRESSURE: 80 MMHG

## 2023-06-15 DIAGNOSIS — F41.0 GENERALIZED ANXIETY DISORDER WITH PANIC ATTACKS: ICD-10-CM

## 2023-06-15 DIAGNOSIS — F41.1 GENERALIZED ANXIETY DISORDER WITH PANIC ATTACKS: ICD-10-CM

## 2023-06-15 DIAGNOSIS — F33.2 SEVERE EPISODE OF RECURRENT MAJOR DEPRESSIVE DISORDER, WITHOUT PSYCHOTIC FEATURES: Primary | ICD-10-CM

## 2023-06-15 DIAGNOSIS — R23.2 HOT FLASHES: ICD-10-CM

## 2023-06-15 RX ORDER — DESVENLAFAXINE SUCCINATE 50 MG/1
50 TABLET, EXTENDED RELEASE ORAL DAILY
Qty: 30 TABLET | Refills: 1 | Status: SHIPPED | OUTPATIENT
Start: 2023-06-15

## 2023-06-15 NOTE — ASSESSMENT & PLAN NOTE
Psychological condition is unchanged.  Starting Pristiq  Psychological condition  will be reassessed in 4 weeks.

## 2023-06-15 NOTE — PROGRESS NOTES
"Chief Complaint  Med Refill, Anxiety, and Depression    Subjective    History of Present Illness:  Latoya Gibson is a 47 y.o. female who presents today for problems with recurrent depression and anxiety.    It has been almost a year since our last visit together.  At that time she was on a combination of Vraylar, buspirone, and Wellbutrin with as needed hydroxyzine.  She had been following regularly with psychiatry as well and they recommended that she stop working (per her report) due to the severity of her depression and mental health issues.    She has stopped working but also has stopped all of her medications and is having flareups with depression and anxiety but no suicidal ideation or plan.    She does plan to work towards disability that was previously recommended by her psychiatrist and is working to get back in with her psychiatrist for follow-up.    She is having hot flash symptoms and would like to discuss something for anxiety and depression that might also help her hot flash symptoms.    We discussed Effexor versus Pristiq versus Cymbalta.  She did do well with Cymbalta in the past but psychiatry did take her off this and transition her to Vraylar with Wellbutrin.    She is interested in a trial with Pristiq.  She has tried and failed Prozac, Paxil, Wellbutrin, Cymbalta, Vraylar, and BuSpar in the past.    We discussed she is past due for health maintenance and screening and she is willing to get set up later this year for her physical together.    Colonoscopy up-to-date.    Objective   Vital Signs:   /80   Pulse 65   Ht 165.1 cm (65\")   Wt 67.6 kg (149 lb)   SpO2 98%   BMI 24.79 kg/m²     Review of Systems   Constitutional:  Positive for fatigue. Negative for appetite change, chills and fever.   HENT:  Negative for hearing loss.    Eyes:  Negative for blurred vision.   Respiratory:  Negative for chest tightness.    Cardiovascular:  Negative for chest pain.   Gastrointestinal:  Negative for " abdominal pain.   Genitourinary:         Hot flash symptoms   Musculoskeletal:  Negative for gait problem.   Skin:  Negative for rash.   Psychiatric/Behavioral:  Positive for sleep disturbance and depressed mood. Negative for suicidal ideas.      Past History:  Medical History: has a past medical history of Anxiety, Dysmenorrhea, Menorrhagia (04/30/2015), Menorrhagia, Neurotic depression, and Suicidal behavior with attempted self-injury.   Surgical History: has a past surgical history that includes Tubal ligation (1997); d & c hysteroscopy endometrial ablation (04/30/2015); and laparoscopic exploration for ectopic pregnancy (04/21/2008).   Family History: family history is not on file.   Social History: reports that she quit smoking about 23 years ago. Her smoking use included cigarettes. She has a 7.50 pack-year smoking history. She has never used smokeless tobacco. She reports current alcohol use. She reports that she does not use drugs.      Current Outpatient Medications:     desvenlafaxine (Pristiq) 50 MG 24 hr tablet, Take 1 tablet by mouth Daily. (Pt failed prozac, cymbalta, vraylar, wellbutrin, buspar).  Please approve pristiq, Disp: 30 tablet, Rfl: 1    Allergies: Patient has no known allergies.    Physical Exam  Constitutional:       Appearance: Normal appearance.   HENT:      Head: Normocephalic.      Right Ear: External ear normal.      Left Ear: External ear normal.      Nose: Nose normal.   Eyes:      Pupils: Pupils are equal, round, and reactive to light.   Cardiovascular:      Rate and Rhythm: Normal rate and regular rhythm.      Heart sounds: Normal heart sounds.   Pulmonary:      Effort: Pulmonary effort is normal.      Breath sounds: Normal breath sounds.   Musculoskeletal:         General: Normal range of motion.      Cervical back: Normal range of motion.   Skin:     General: Skin is warm and dry.   Neurological:      General: No focal deficit present.      Mental Status: She is alert.    Psychiatric:      Comments: Flareups with flareups of depression and anxiety off medications.  Interested in restarting treatment.  Interested in trial with Pristiq.  No suicidal ideation or plan        Result Review                   Assessment and Plan  Diagnoses and all orders for this visit:    1. Severe episode of recurrent major depressive disorder, without psychotic features (Primary)  Assessment & Plan:  Patient's depression is recurrent and is moderate without psychosis. Their depression is currently active and the condition is unchanged. This will be reassessed in 4 weeks. F/U as described:patient was prescribed an antidepressant medicine    She is working to reestablish care with her psychiatrist.    Treatment options discussed and given menopausal symptoms along with past failed medications we decided to start Pristiq at 50 mg.  Side effects and expectations reviewed.  Close follow-up planned with recheck here in 1 month or sooner problems arise.    Encouraged past-due health maintenance and screening as well.    Orders:  -     desvenlafaxine (Pristiq) 50 MG 24 hr tablet; Take 1 tablet by mouth Daily. (Pt failed prozac, cymbalta, vraylar, wellbutrin, buspar).  Please approve pristiq  Dispense: 30 tablet; Refill: 1    2. Generalized anxiety disorder with panic attacks  Assessment & Plan:  Psychological condition is unchanged.  Starting Pristiq  Psychological condition  will be reassessed in 4 weeks.    Orders:  -     desvenlafaxine (Pristiq) 50 MG 24 hr tablet; Take 1 tablet by mouth Daily. (Pt failed prozac, cymbalta, vraylar, wellbutrin, buspar).  Please approve pristiq  Dispense: 30 tablet; Refill: 1    3. Hot flashes  Assessment & Plan:  Given trial with pristiq for depression/anxiety and hot flash symptoms    Recheck in 1 month.          BMI is within normal parameters. No other follow-up for BMI required.          Follow Up  Return in about 4 weeks (around 7/13/2023) for Med recheck.    Killian  Keyon Sanchez MD

## 2023-06-15 NOTE — ASSESSMENT & PLAN NOTE
Patient's depression is recurrent and is moderate without psychosis. Their depression is currently active and the condition is unchanged. This will be reassessed in 4 weeks. F/U as described:patient was prescribed an antidepressant medicine    She is working to reestablish care with her psychiatrist.    Treatment options discussed and given menopausal symptoms along with past failed medications we decided to start Pristiq at 50 mg.  Side effects and expectations reviewed.  Close follow-up planned with recheck here in 1 month or sooner problems arise.    Encouraged past-due health maintenance and screening as well.

## 2023-07-17 PROBLEM — Z00.00 GENERAL MEDICAL EXAM: Status: ACTIVE | Noted: 2023-07-17

## 2023-07-17 PROBLEM — F41.9 ANXIETY AND DEPRESSION: Status: RESOLVED | Noted: 2022-05-06 | Resolved: 2023-07-17

## 2023-07-17 PROBLEM — F41.1 GENERALIZED ANXIETY DISORDER WITH PANIC ATTACKS: Chronic | Status: ACTIVE | Noted: 2022-08-18

## 2023-07-17 PROBLEM — R10.9 ABDOMINAL PAIN: Status: RESOLVED | Noted: 2022-05-06 | Resolved: 2023-07-17

## 2023-07-17 PROBLEM — Z12.31 SCREENING MAMMOGRAM FOR BREAST CANCER: Status: ACTIVE | Noted: 2023-07-17

## 2023-07-17 PROBLEM — E66.9 ADIPOSITY: Status: RESOLVED | Noted: 2022-05-06 | Resolved: 2023-07-17

## 2023-07-17 PROBLEM — F32.A ANXIETY AND DEPRESSION: Status: RESOLVED | Noted: 2022-05-06 | Resolved: 2023-07-17

## 2023-07-17 PROBLEM — D50.9 ANEMIA, IRON DEFICIENCY: Status: RESOLVED | Noted: 2022-05-06 | Resolved: 2023-07-17

## 2023-07-17 PROBLEM — Z13.1 DIABETES MELLITUS SCREENING: Status: ACTIVE | Noted: 2023-07-17

## 2023-07-17 PROBLEM — H57.02 ANISOCORIA: Status: RESOLVED | Noted: 2022-05-06 | Resolved: 2023-07-17

## 2023-07-17 PROBLEM — E55.9 AVITAMINOSIS D: Status: RESOLVED | Noted: 2022-05-06 | Resolved: 2023-07-17

## 2023-07-17 PROBLEM — F41.0 GENERALIZED ANXIETY DISORDER WITH PANIC ATTACKS: Chronic | Status: ACTIVE | Noted: 2022-08-18

## 2023-07-17 PROBLEM — R23.2 HOT FLASHES: Chronic | Status: ACTIVE | Noted: 2023-06-15

## 2023-07-24 ENCOUNTER — LAB (OUTPATIENT)
Dept: FAMILY MEDICINE CLINIC | Facility: CLINIC | Age: 48
End: 2023-07-24
Payer: MEDICAID

## 2023-07-24 DIAGNOSIS — Z13.1 DIABETES MELLITUS SCREENING: ICD-10-CM

## 2023-07-24 DIAGNOSIS — Z00.00 GENERAL MEDICAL EXAM: ICD-10-CM

## 2023-07-24 PROCEDURE — 36415 COLL VENOUS BLD VENIPUNCTURE: CPT | Performed by: FAMILY MEDICINE

## 2023-07-25 DIAGNOSIS — E78.2 HYPERLIPIDEMIA, MIXED: Primary | ICD-10-CM

## 2023-07-25 LAB
ALBUMIN SERPL-MCNC: 4.7 G/DL (ref 3.9–4.9)
ALBUMIN/GLOB SERPL: 2.2 {RATIO} (ref 1.2–2.2)
ALP SERPL-CCNC: 73 IU/L (ref 44–121)
ALT SERPL-CCNC: 32 IU/L (ref 0–32)
AST SERPL-CCNC: 28 IU/L (ref 0–40)
BASOPHILS # BLD AUTO: 0.1 X10E3/UL (ref 0–0.2)
BASOPHILS NFR BLD AUTO: 1 %
BILIRUB SERPL-MCNC: 0.3 MG/DL (ref 0–1.2)
BUN SERPL-MCNC: 22 MG/DL (ref 6–24)
BUN/CREAT SERPL: 37 (ref 9–23)
CALCIUM SERPL-MCNC: 9.5 MG/DL (ref 8.7–10.2)
CHLORIDE SERPL-SCNC: 101 MMOL/L (ref 96–106)
CHOLEST SERPL-MCNC: 263 MG/DL (ref 100–199)
CO2 SERPL-SCNC: 22 MMOL/L (ref 20–29)
CREAT SERPL-MCNC: 0.6 MG/DL (ref 0.57–1)
EGFRCR SERPLBLD CKD-EPI 2021: 111 ML/MIN/1.73
EOSINOPHIL # BLD AUTO: 0.3 X10E3/UL (ref 0–0.4)
EOSINOPHIL NFR BLD AUTO: 5 %
ERYTHROCYTE [DISTWIDTH] IN BLOOD BY AUTOMATED COUNT: 13.1 % (ref 11.7–15.4)
GLOBULIN SER CALC-MCNC: 2.1 G/DL (ref 1.5–4.5)
GLUCOSE SERPL-MCNC: 98 MG/DL (ref 70–99)
HBA1C MFR BLD: 5.4 % (ref 4.8–5.6)
HCT VFR BLD AUTO: 37.8 % (ref 34–46.6)
HDLC SERPL-MCNC: 101 MG/DL
HGB BLD-MCNC: 12.4 G/DL (ref 11.1–15.9)
IMM GRANULOCYTES # BLD AUTO: 0 X10E3/UL (ref 0–0.1)
IMM GRANULOCYTES NFR BLD AUTO: 0 %
LDLC SERPL CALC-MCNC: 154 MG/DL (ref 0–99)
LYMPHOCYTES # BLD AUTO: 2 X10E3/UL (ref 0.7–3.1)
LYMPHOCYTES NFR BLD AUTO: 34 %
MCH RBC QN AUTO: 30.2 PG (ref 26.6–33)
MCHC RBC AUTO-ENTMCNC: 32.8 G/DL (ref 31.5–35.7)
MCV RBC AUTO: 92 FL (ref 79–97)
MONOCYTES # BLD AUTO: 0.6 X10E3/UL (ref 0.1–0.9)
MONOCYTES NFR BLD AUTO: 10 %
NEUTROPHILS # BLD AUTO: 3 X10E3/UL (ref 1.4–7)
NEUTROPHILS NFR BLD AUTO: 50 %
PLATELET # BLD AUTO: 265 X10E3/UL (ref 150–450)
POTASSIUM SERPL-SCNC: 4.5 MMOL/L (ref 3.5–5.2)
PROT SERPL-MCNC: 6.8 G/DL (ref 6–8.5)
RBC # BLD AUTO: 4.11 X10E6/UL (ref 3.77–5.28)
SODIUM SERPL-SCNC: 140 MMOL/L (ref 134–144)
T4 FREE SERPL-MCNC: 0.9 NG/DL (ref 0.82–1.77)
TRIGL SERPL-MCNC: 50 MG/DL (ref 0–149)
TSH SERPL DL<=0.005 MIU/L-ACNC: 3.3 UIU/ML (ref 0.45–4.5)
VLDLC SERPL CALC-MCNC: 8 MG/DL (ref 5–40)
WBC # BLD AUTO: 6 X10E3/UL (ref 3.4–10.8)

## 2023-07-25 RX ORDER — ROSUVASTATIN CALCIUM 5 MG/1
5 TABLET, COATED ORAL DAILY
Qty: 90 TABLET | Refills: 2 | Status: SHIPPED | OUTPATIENT
Start: 2023-07-25

## 2023-07-27 ENCOUNTER — TELEPHONE (OUTPATIENT)
Dept: FAMILY MEDICINE CLINIC | Facility: CLINIC | Age: 48
End: 2023-07-27

## 2023-07-27 NOTE — TELEPHONE ENCOUNTER
Caller: Latoya Gibson    Relationship: Self    Best call back number:     [phone]     What form or medical record are you requesting: LETTER FOR A [employer] TO HELP WITH THE PATIENT UTILITIES BILLS     STATING THAT SHE HAD A NERVOUS BREAKDOWN AND PAPERS FROM THE HOSPITAL     How would you like to receive the form or medical records (pick-up, mail, fax):  PUT INTO MY CHART     Timeframe paperwork needed:  TODAY 7-27-23    Additional notes: PLEASE CALL

## 2023-07-27 NOTE — TELEPHONE ENCOUNTER
Patient states she missed a call from the office but did not receive a voicemail of who it was. Patient is also unaware of what it may be regarding.

## 2023-07-27 NOTE — LETTER
July 31, 2023       Patient: Latoya Gibson   YOB: 1975   Date of Visit: 7/27/2023     To whom it may concern:    Latoya Conte is currently a patient under my medical care experiencing health problems that affect her ability to work.      Please take this into consideration if you are able to help her with any of her current utility bills due to her health conditions that affect her ability to work.        Electronically signed by:      Killian Sanchez MD

## 2023-07-31 NOTE — PROGRESS NOTES
The message below may be given by the hub:    Please contact patient with [facility] lab result message.    There is a lab result message attached to their lab results... but I received notification that the results were not viewed within 5 days on [facility].  I need to make sure that they get their lab result message.    Thank you.

## 2023-08-08 ENCOUNTER — PATIENT MESSAGE (OUTPATIENT)
Dept: FAMILY MEDICINE CLINIC | Facility: CLINIC | Age: 48
End: 2023-08-08
Payer: MEDICAID

## 2023-08-08 DIAGNOSIS — F33.2 SEVERE EPISODE OF RECURRENT MAJOR DEPRESSIVE DISORDER, WITHOUT PSYCHOTIC FEATURES: Chronic | ICD-10-CM

## 2023-08-08 DIAGNOSIS — R23.2 HOT FLASHES: ICD-10-CM

## 2023-08-08 DIAGNOSIS — F41.0 GENERALIZED ANXIETY DISORDER WITH PANIC ATTACKS: ICD-10-CM

## 2023-08-08 DIAGNOSIS — F41.1 GENERALIZED ANXIETY DISORDER WITH PANIC ATTACKS: ICD-10-CM

## 2023-08-09 RX ORDER — DESVENLAFAXINE 100 MG/1
100 TABLET, EXTENDED RELEASE ORAL DAILY
Qty: 90 TABLET | Refills: 0 | Status: SHIPPED | OUTPATIENT
Start: 2023-08-09

## 2023-08-09 NOTE — TELEPHONE ENCOUNTER
From: Latoya Gibson  To: Killian Sanchez  Sent: 8/8/2023 4:22 PM EDT  Subject: My medicine     So you told me if I ever needed anything to let you know so I think we need to up my dose cause I have been crying a lot and it could be cause my daughter just moved away with her boyfriend to [place] and I miss her so much that's my baby girl I know she has to live her own life but I guess it's all new to me. And my mom in law lives with us and she is driving me crazy . And I am getting my hot flashes more .. please call me I want to do something about this soon cause I don't won't to go to that dark place I was at just please calll me so we can figure this out . Thank you

## 2023-08-11 ENCOUNTER — TELEPHONE (OUTPATIENT)
Dept: FAMILY MEDICINE CLINIC | Facility: CLINIC | Age: 48
End: 2023-08-11

## 2023-08-11 NOTE — TELEPHONE ENCOUNTER
Caller: Latoya Gibson    Relationship to patient: Self    Best call back number: [phone]     Chief complaint: MEDICATION F/U    Type of visit: OFFICE VISIT     Requested date: 09/11/2023 WITH DR KAHN      If rescheduling, when is the original appointment: 10/11/2023 WITH DR KAHN    Additional notes:THE PATIENT ADVISED SHE MESSAGE DR KAHN ABOUT A MEDICATION AND HE ADVISED HER TO SCHEDULE 30 DAY FOLLOW UP, BUT THE SOONEST AVAILABLE WAS 10/11/2023 AND SHE IS REQUESTING 09/11/2023.    PLEASE CALL AND ADVISE

## 2023-08-22 ENCOUNTER — TELEPHONE (OUTPATIENT)
Dept: FAMILY MEDICINE CLINIC | Facility: CLINIC | Age: 48
End: 2023-08-22
Payer: MEDICAID

## 2023-08-22 NOTE — LETTER
August 23, 2023       Patient: Latoya Gibson   YOB: 1975   Date of Visit: 8/22/2023     To whom it may concern:    Latoya Gibson is a patient under my medical care.    Please allow her to have her dog as her emotional support animal.          Electronically signed by:      Killian Sanchez MD

## 2023-08-22 NOTE — TELEPHONE ENCOUNTER
Caller: Latoya Gibson    Relationship: Self    Best call back number: [phone]    What form or medical record are you requesting: PATIENT IS ASKING FOR A LETTER STATING THAT HER DOG IS AN EMOTIONAL SUPPORT ANIMAL     Who is requesting this form or medical record from you: PATIENT     How would you like to receive the form or medical records (pick-up, mail, fax):      If fax, what is the fax number:   If mail, what is the address:   If pick-up, provide patient with address and location details    Timeframe paperwork needed: ASAP

## 2023-08-24 ENCOUNTER — PATIENT MESSAGE (OUTPATIENT)
Dept: FAMILY MEDICINE CLINIC | Facility: CLINIC | Age: 48
End: 2023-08-24
Payer: MEDICAID

## 2023-08-25 NOTE — TELEPHONE ENCOUNTER
From: Latoya Gibson  To: Killian Sanchez  Sent: 8/24/2023 3:29 PM EDT  Subject: Dog Note     I wanted to Thank you so much for doing that for me . My Paula does give me peace when I am out places . Hope u have a good day.

## 2023-08-29 ENCOUNTER — OFFICE VISIT (OUTPATIENT)
Dept: FAMILY MEDICINE CLINIC | Facility: CLINIC | Age: 48
End: 2023-08-29
Payer: MEDICAID

## 2023-08-29 VITALS
OXYGEN SATURATION: 99 % | WEIGHT: 161.8 LBS | SYSTOLIC BLOOD PRESSURE: 136 MMHG | DIASTOLIC BLOOD PRESSURE: 82 MMHG | BODY MASS INDEX: 26.96 KG/M2 | HEIGHT: 65 IN | HEART RATE: 71 BPM

## 2023-08-29 DIAGNOSIS — Z01.419 ENCNTR FOR GYN EXAM (GENERAL) (ROUTINE) W/O ABN FINDINGS: Primary | ICD-10-CM

## 2023-08-29 NOTE — PROGRESS NOTES
"Chief Complaint  Annual Exam (Patient needs a PAP is fasting today)    Subjective          Latoya Gibson presents to [facility]  History of Present Illness patient is here today for her annual Pap smear.  She says that she has no issues or concerns and she has never had an abnormal Pap.  She did say that her last Pap was several years ago.    Objective   Vital Signs:   /82 (BP Location: Left arm)   Pulse 71   Ht 165.1 cm (65\")   Wt 73.4 kg (161 lb 12.8 oz)   SpO2 99%   BMI 26.92 kg/mý     Body mass index is 26.92 kg/mý.    Review of Systems   Constitutional:  Negative for chills, fatigue and fever.   Respiratory:  Negative for cough and shortness of breath.    Cardiovascular:  Negative for chest pain and palpitations.   Genitourinary:  Negative for dyspareunia, genital sores, pelvic pain, vaginal discharge and vaginal pain.   Musculoskeletal:  Negative for back pain and myalgias.   Neurological:  Negative for dizziness and headache.   Psychiatric/Behavioral:  Negative for depressed mood. The patient is not nervous/anxious.      Past History:  Medical History: has a past medical history of Anxiety, Dysmenorrhea, Menorrhagia (04/30/2015), Menorrhagia, Neurotic depression, and Suicidal behavior with attempted self-injury.   Surgical History: has a past surgical history that includes Tubal ligation (1997); d & c hysteroscopy endometrial ablation (04/30/2015); and laparoscopic exploration for ectopic pregnancy (04/21/2008).   Family History: family history is not on file.   Social History: reports that she quit smoking about 23 years ago. Her smoking use included cigarettes. She has a 7.50 pack-year smoking history. She has never used smokeless tobacco. She reports current alcohol use. She reports that she does not use drugs.      Current Outpatient Medications:     desvenlafaxine (Pristiq) 100 MG 24 hr tablet, Take 1 tablet by mouth Daily. For depression, anxiety and hot " flashes. (DOSE INCREASED), Disp: 90 tablet, Rfl: 0    hydrOXYzine pamoate (Vistaril) 25 MG capsule, Take 1-2 capsules by mouth 3 (Three) Times a Day As Needed for Anxiety., Disp: 30 capsule, Rfl: 5    rosuvastatin (Crestor) 5 MG tablet, Take 1 tablet by mouth Daily. For cholesterol, heart protection, and stroke prevention, Disp: 90 tablet, Rfl: 2    Allergies: Patient has no known allergies.    Physical Exam  Exam conducted with a chaperone present.   Genitourinary:     Exam position: Lithotomy position.      Pubic Area: No rash.       Vagina: Normal.      Cervix: Normal.      Uterus: Normal.       Adnexa: Right adnexa normal and left adnexa normal.      Rectum: Normal.   Neurological:      Mental Status: She is alert and oriented to person, place, and time.   Psychiatric:         Behavior: Behavior normal.        Result Review :                   Assessment and Plan    Diagnoses and all orders for this visit:    1. Encntr for gyn exam (general) (routine) w/o abn findings (Primary)  Assessment & Plan:  Normal Exam today, Pap test results pending.  Further recommendations will depend upon those results.           Follow Up   No follow-ups on file.  Patient was given instructions and counseling regarding her condition or for health maintenance advice. Please see specific information pulled into the AVS if appropriate.     Mary Kay Joel PA-C

## 2023-08-29 NOTE — ASSESSMENT & PLAN NOTE
Normal Exam today, Pap test results pending.  Further recommendations will depend upon those results.

## 2023-09-02 LAB
CONV .: NORMAL
CYTOLOGIST CVX/VAG CYTO: NORMAL
CYTOLOGY CVX/VAG DOC CYTO: NORMAL
CYTOLOGY CVX/VAG DOC THIN PREP: NORMAL
DX ICD CODE: NORMAL
HIV 1 & 2 AB SER-IMP: NORMAL
OTHER STN SPEC: NORMAL
STAT OF ADQ CVX/VAG CYTO-IMP: NORMAL

## 2023-09-05 ENCOUNTER — TELEPHONE (OUTPATIENT)
Dept: FAMILY MEDICINE CLINIC | Facility: CLINIC | Age: 48
End: 2023-09-05
Payer: MEDICAID

## 2023-09-05 NOTE — TELEPHONE ENCOUNTER
Left Message: If Pt calls back ok for HUB to read--Her pap was fine. Pt has viewed message on My Chart

## 2023-09-13 ENCOUNTER — PATIENT MESSAGE (OUTPATIENT)
Dept: FAMILY MEDICINE CLINIC | Facility: CLINIC | Age: 48
End: 2023-09-13
Payer: MEDICAID

## 2023-09-13 NOTE — TELEPHONE ENCOUNTER
From: Latoya Gibson  To: Killian Sanchez  Sent: 9/13/2023 10:02 AM EDT  Subject: Need a paper stating I am unable to work so I can give it to the food stamp office    Good morning Killian I need a favor. Can you route me a paper stating that I am unable to work so I can give it to the food stamp office please

## 2023-09-13 NOTE — LETTER
September 13, 2023       Patient: Latoya Gibson   YOB: 1975   Date of Visit: 9/13/2023     To whom it may concern:    Latoya Gibson is a patient under my medical care and due to her current medical conditions is unable to work.  Please take this into consideration regarding her food stamp benefits.        Electronically signed by:      Killian Sanchez MD

## 2023-09-13 NOTE — LETTER
September 13, 2023       Patient: Latoya Gibson   YOB: 1975   Date of Visit: 9/13/2023     To whom it may concern:    Latoya Conte is a patient under my medical care and due to her current medical conditions is unable to work.  Please take this into consideration regarding her food stamp benefits.    Electronically signed by:      Killian Sanchez MD

## 2023-09-19 ENCOUNTER — PATIENT MESSAGE (OUTPATIENT)
Dept: FAMILY MEDICINE CLINIC | Facility: CLINIC | Age: 48
End: 2023-09-19
Payer: MEDICAID

## 2023-09-19 NOTE — LETTER
September 21, 2023         Patient: Latoya Gibson   YOB: 1975   Date of Visit: 9/19/2023       Latoya Gibson is a patient under my medical care.  Due to her ongoing health issues she is unable to work at this time.      Electronically signed by:        Killian Sanchez MD

## 2023-09-21 NOTE — TELEPHONE ENCOUNTER
From: Latoya Gibson  To: Killian Sanchez  Sent: 9/19/2023 7:31 AM EDT  Subject: Letter for Foodstamps     Good morning hope your having a good day , so the paper you wrote me it worked so thanks for that I really appreciate it… But I need one that says Latoya Gibson is currently unable to work at this time … Hope you can help me out …

## 2023-10-11 ENCOUNTER — OFFICE VISIT (OUTPATIENT)
Dept: FAMILY MEDICINE CLINIC | Facility: CLINIC | Age: 48
End: 2023-10-11
Payer: MEDICAID

## 2023-10-11 VITALS
HEIGHT: 65 IN | SYSTOLIC BLOOD PRESSURE: 128 MMHG | BODY MASS INDEX: 28.59 KG/M2 | WEIGHT: 171.6 LBS | DIASTOLIC BLOOD PRESSURE: 82 MMHG

## 2023-10-11 DIAGNOSIS — F41.1 GENERALIZED ANXIETY DISORDER WITH PANIC ATTACKS: Primary | Chronic | ICD-10-CM

## 2023-10-11 DIAGNOSIS — F39 MOOD DISORDER: Chronic | ICD-10-CM

## 2023-10-11 DIAGNOSIS — F41.0 GENERALIZED ANXIETY DISORDER WITH PANIC ATTACKS: Primary | Chronic | ICD-10-CM

## 2023-10-11 DIAGNOSIS — F90.2 ATTENTION DEFICIT HYPERACTIVITY DISORDER (ADHD), COMBINED TYPE: ICD-10-CM

## 2023-10-11 DIAGNOSIS — E78.2 HYPERLIPIDEMIA, MIXED: ICD-10-CM

## 2023-10-11 DIAGNOSIS — F33.2 SEVERE EPISODE OF RECURRENT MAJOR DEPRESSIVE DISORDER, WITHOUT PSYCHOTIC FEATURES: Chronic | ICD-10-CM

## 2023-10-11 DIAGNOSIS — R23.2 HOT FLASHES: ICD-10-CM

## 2023-10-11 RX ORDER — ATOMOXETINE 40 MG/1
40 CAPSULE ORAL DAILY
Start: 2023-10-11

## 2023-10-11 RX ORDER — DESVENLAFAXINE 100 MG/1
100 TABLET, EXTENDED RELEASE ORAL DAILY
Qty: 90 TABLET | Refills: 1 | Status: SHIPPED | OUTPATIENT
Start: 2023-10-11

## 2023-10-11 NOTE — PROGRESS NOTES
"Chief Complaint  Anxiety and Depression    Subjective    History of Present Illness:  Latoya Gibson is a 47 y.o. female who presents today for follow-up after adjusting up her pristiq for flareups with depression    She has seen psychiatry since her last visit and they did add low-dose Strattera.  She does have follow-up next month with psychiatry.    Overall she has done really well with restarting treatment for depression and anxiety and feels so much better with the combination of Pristiq and addition of Strattera.    She is doing well with low-dose Crestor for hyperlipidemia and will come in to get blood work done before her next appointment together.     She does plan to work towards disability that was previously recommended by her psychiatrist and is working to get back in with her psychiatrist for follow-up.     Doing really well with pristiq.       Colonoscopy up-to-date 1/23/2020 with no polyps and plan to repeat in 10 years (January 2030) for screening.     PAP uptodate 8/2023.  Plans for repeat 8/2026     Declines COVID vaccination.  Declines flu shot.     Objective   Vital Signs:   /82   Ht 165.1 cm (65\")   Wt 77.8 kg (171 lb 9.6 oz)   BMI 28.56 kg/mý     Review of Systems   Constitutional:  Negative for appetite change, chills and fever.   HENT:  Negative for hearing loss.    Eyes:  Negative for blurred vision.   Respiratory:  Negative for chest tightness.    Cardiovascular:  Negative for chest pain.   Gastrointestinal:  Negative for abdominal pain.   Musculoskeletal:  Negative for gait problem.   Skin:  Negative for rash.   Psychiatric/Behavioral:  Negative for depressed mood.        Past History:  Medical History: has a past medical history of Anxiety, Dysmenorrhea, Menorrhagia (04/30/2015), Menorrhagia, Neurotic depression, and Suicidal behavior with attempted self-injury.   Surgical History: has a past surgical history that includes Tubal ligation (1997); d & c hysteroscopy endometrial " ablation (04/30/2015); and laparoscopic exploration for ectopic pregnancy (04/21/2008).   Family History: family history is not on file.   Social History: reports that she quit smoking about 23 years ago. Her smoking use included cigarettes. She has a 7.50 pack-year smoking history. She has never used smokeless tobacco. She reports current alcohol use. She reports that she does not use drugs.      Current Outpatient Medications:     desvenlafaxine (Pristiq) 100 MG 24 hr tablet, Take 1 tablet by mouth Daily. For depression, anxiety and hot flashes., Disp: 90 tablet, Rfl: 1    hydrOXYzine pamoate (Vistaril) 25 MG capsule, Take 1-2 capsules by mouth 3 (Three) Times a Day As Needed for Anxiety., Disp: 30 capsule, Rfl: 5    rosuvastatin (Crestor) 5 MG tablet, Take 1 tablet by mouth Daily. For cholesterol, heart protection, and stroke prevention, Disp: 90 tablet, Rfl: 2    atomoxetine (Strattera) 40 MG capsule, Take 1 capsule by mouth Daily. (Rx by psychiary), Disp: , Rfl:     Allergies: Patient has no known allergies.    Physical Exam  Constitutional:       Appearance: Normal appearance.   HENT:      Head: Normocephalic.      Right Ear: External ear normal.      Left Ear: External ear normal.      Nose: Nose normal.   Eyes:      Pupils: Pupils are equal, round, and reactive to light.   Cardiovascular:      Rate and Rhythm: Normal rate and regular rhythm.      Heart sounds: Normal heart sounds.   Pulmonary:      Effort: Pulmonary effort is normal.      Breath sounds: Normal breath sounds.   Musculoskeletal:         General: Normal range of motion.      Cervical back: Normal range of motion.   Skin:     General: Skin is warm and dry.   Neurological:      General: No focal deficit present.      Mental Status: She is alert.   Psychiatric:         Mood and Affect: Mood normal.         Behavior: Behavior normal.         Thought Content: Thought content normal.      Comments: Doing well with current regimen for depression and  anxiety with ADHD          Result Review                   Assessment and Plan  Diagnoses and all orders for this visit:    1. Generalized anxiety disorder with panic attacks (Primary)  Assessment & Plan:  Psychological condition is improving with treatment.  Medication changes per orders.  Psychological condition  will be reassessed at the next regular appointment.    Orders:  -     desvenlafaxine (Pristiq) 100 MG 24 hr tablet; Take 1 tablet by mouth Daily. For depression, anxiety and hot flashes.  Dispense: 90 tablet; Refill: 1    2. Mood disorder  Assessment & Plan:  Doing well with current regimen.  See above      3. Attention deficit hyperactivity disorder (ADHD), combined type  Assessment & Plan:  Updated med list was started on Strattera from psychiatry.  She does have ongoing follow-up plan    Orders:  -     atomoxetine (Strattera) 40 MG capsule; Take 1 capsule by mouth Daily. (Rx by psychiary)    4. Hot flashes  Assessment & Plan:  Doing well with Pristiq.    Orders:  -     desvenlafaxine (Pristiq) 100 MG 24 hr tablet; Take 1 tablet by mouth Daily. For depression, anxiety and hot flashes.  Dispense: 90 tablet; Refill: 1    5. Severe episode of recurrent major depressive disorder, without psychotic features  Assessment & Plan:  Patient's depression is recurrent and is mild without psychosis. Their depression is currently in partial remission and the condition is improving with treatment. This will be reassessed at the next regular appointment. F/U as described:patient will continue current medication therapy.    Orders:  -     desvenlafaxine (Pristiq) 100 MG 24 hr tablet; Take 1 tablet by mouth Daily. For depression, anxiety and hot flashes.  Dispense: 90 tablet; Refill: 1    6. Hyperlipidemia, mixed  Assessment & Plan:  Lipid abnormalities are improving with treatment.  Pharmacotherapy as ordered.  Lipids will be reassessed in 6 months.    Orders:  -     Comprehensive Metabolic Panel; Future  -     Lipid  Panel; Future                   Follow Up  Return in about 3 months (around 1/11/2024).    Killian Sanchez MD

## 2023-11-08 ENCOUNTER — TELEPHONE (OUTPATIENT)
Dept: FAMILY MEDICINE CLINIC | Facility: CLINIC | Age: 48
End: 2023-11-08
Payer: MEDICAID

## 2023-11-08 NOTE — TELEPHONE ENCOUNTER
Hub staff attempted to follow warm transfer process and was unsuccessful     Caller: Latoya Gibson    Relationship to patient: Self    Best call back number: [phone]     Patient is needing: PATIENT HAS AN APPOINTMENT ON 1/17/24 AND DR. KAHN WANTS HER TO COME IN THE SAME DAY BEFORE THE APPOINTMENT TO GET THE BLOOD WORK DONE AND SHE NEEDS THAT LAB APPOINTMENT SCHEDULED.

## 2023-12-20 ENCOUNTER — TELEPHONE (OUTPATIENT)
Dept: FAMILY MEDICINE CLINIC | Facility: CLINIC | Age: 48
End: 2023-12-20
Payer: MEDICAID

## 2023-12-20 NOTE — TELEPHONE ENCOUNTER
HUB RELAY & TRANSFER PLEASE  Called patient to get scheduled for appt. Had to M for her to call office back. Please transfer to Lucrecia at the west office.

## 2023-12-20 NOTE — TELEPHONE ENCOUNTER
----- Message from Latoya Gibson sent at 12/19/2023  7:32 AM EST -----  Regarding: Appointment Request  Contact: [phone]  Appointment Request From: Latoya Gibson    With Provider: Mary Kay Joel [[facility]]    Preferred Date Range: 12/19/2023 – 12/19/2023    Preferred Times: Any Time    Reason for visit: Subsequent Medicare Visit    Comments:  I have a bad ear ache

## 2024-01-17 ENCOUNTER — OFFICE VISIT (OUTPATIENT)
Dept: FAMILY MEDICINE CLINIC | Facility: CLINIC | Age: 49
End: 2024-01-17
Payer: MEDICAID

## 2024-01-17 VITALS
OXYGEN SATURATION: 100 % | BODY MASS INDEX: 30.66 KG/M2 | HEIGHT: 65 IN | HEART RATE: 87 BPM | WEIGHT: 184 LBS | SYSTOLIC BLOOD PRESSURE: 138 MMHG | DIASTOLIC BLOOD PRESSURE: 84 MMHG

## 2024-01-17 DIAGNOSIS — E66.09 CLASS 1 OBESITY DUE TO EXCESS CALORIES WITH SERIOUS COMORBIDITY AND BODY MASS INDEX (BMI) OF 30.0 TO 30.9 IN ADULT: ICD-10-CM

## 2024-01-17 DIAGNOSIS — F39 MOOD DISORDER: Chronic | ICD-10-CM

## 2024-01-17 DIAGNOSIS — F41.1 GENERALIZED ANXIETY DISORDER WITH PANIC ATTACKS: Chronic | ICD-10-CM

## 2024-01-17 DIAGNOSIS — F41.0 GENERALIZED ANXIETY DISORDER WITH PANIC ATTACKS: Chronic | ICD-10-CM

## 2024-01-17 DIAGNOSIS — R23.2 HOT FLASHES: Chronic | ICD-10-CM

## 2024-01-17 DIAGNOSIS — F90.2 ATTENTION DEFICIT HYPERACTIVITY DISORDER (ADHD), COMBINED TYPE: ICD-10-CM

## 2024-01-17 DIAGNOSIS — F33.2 SEVERE EPISODE OF RECURRENT MAJOR DEPRESSIVE DISORDER, WITHOUT PSYCHOTIC FEATURES: Primary | Chronic | ICD-10-CM

## 2024-01-17 DIAGNOSIS — E78.2 HYPERLIPIDEMIA, MIXED: ICD-10-CM

## 2024-01-17 PROBLEM — Z01.419 ENCNTR FOR GYN EXAM (GENERAL) (ROUTINE) W/O ABN FINDINGS: Status: RESOLVED | Noted: 2023-08-29 | Resolved: 2024-01-17

## 2024-01-17 PROBLEM — E66.811 CLASS 1 OBESITY DUE TO EXCESS CALORIES WITH SERIOUS COMORBIDITY AND BODY MASS INDEX (BMI) OF 30.0 TO 30.9 IN ADULT: Status: ACTIVE | Noted: 2024-01-17

## 2024-01-17 RX ORDER — ATOMOXETINE 80 MG/1
80 CAPSULE ORAL DAILY
Start: 2024-01-17

## 2024-01-17 RX ORDER — CLONIDINE HYDROCHLORIDE 0.2 MG/1
0.2 TABLET ORAL
Qty: 90 TABLET | Refills: 1 | Status: SHIPPED | OUTPATIENT
Start: 2024-01-17

## 2024-01-17 RX ORDER — DESVENLAFAXINE 100 MG/1
100 TABLET, EXTENDED RELEASE ORAL DAILY
Qty: 90 TABLET | Refills: 1 | Status: SHIPPED | OUTPATIENT
Start: 2024-01-17

## 2024-01-17 RX ORDER — HYDROXYZINE PAMOATE 25 MG/1
25-50 CAPSULE ORAL 3 TIMES DAILY PRN
Qty: 30 CAPSULE | Refills: 5 | Status: SHIPPED | OUTPATIENT
Start: 2024-01-17

## 2024-01-17 RX ORDER — ROSUVASTATIN CALCIUM 5 MG/1
5 TABLET, COATED ORAL DAILY
Qty: 90 TABLET | Refills: 2 | Status: SHIPPED | OUTPATIENT
Start: 2024-01-17

## 2024-01-17 NOTE — ASSESSMENT & PLAN NOTE
Patient's depression is recurrent and is severe without psychosis. Their depression is currently in partial remission and the condition is improving with treatment. This will be reassessed at the next regular appointment. F/U as described:patient will continue current medication therapy.

## 2024-01-17 NOTE — ASSESSMENT & PLAN NOTE
Patient's (Body mass index is 30.62 kg/m².) indicates that they are obese (BMI >30) with health conditions that include dyslipidemias . Weight is improving with treatment. BMI  is above average; BMI management plan is completed. We discussed portion control and increasing exercise.

## 2024-01-17 NOTE — PROGRESS NOTES
"Chief Complaint  Follow-up    Subjective    History of Present Illness:  Latoya Gibson is a 48 y.o. female who presents today for follow-up visit and medication refills    She is doing well with her current regimen for depression with anxiety and ADHD.    Pristiq has been very helpful for depression and hot flash symptoms but she does still have some nighttime symptoms and is interested in trying clonidine at bedtime.    She is not fasting but will return for fasting lab work.    She does continue to follow-up with psychiatry and they have adjusted up her Strattera to 80 mg and this is help with ADHD symptoms.     Overall she has done really well with restarting treatment for depression and anxiety and feels so much better with the combination of Pristiq and addition of Strattera.     She is doing well with low-dose Crestor for hyperlipidemia and will come in to get blood work done before her next appointment together.     She does plan to work towards disability that was previously recommended by her psychiatrist and is working to get back in with her psychiatrist for follow-up.      Colonoscopy up-to-date 1/23/2020 with no polyps and plan to repeat in 10 years (January 2030) for screening.     PAP uptodate 8/2023.  Plans for repeat 8/2026     Declines COVID vaccination.  Declines flu shot.    Objective   Vital Signs:   /84   Pulse 87   Ht 165.1 cm (65\")   Wt 83.5 kg (184 lb)   SpO2 100%   BMI 30.62 kg/m²     Review of Systems   Constitutional:  Negative for appetite change, chills and fever.   HENT:  Negative for hearing loss.    Eyes:  Negative for blurred vision.   Respiratory:  Negative for chest tightness.    Cardiovascular:  Negative for chest pain.   Gastrointestinal:  Negative for abdominal pain.   Genitourinary:         See HPI mild hot flash symptoms despite Pristiq at max dosing.  Interested in trial with clonidine   Musculoskeletal:  Negative for gait problem.   Skin:  Negative for rash. "   Psychiatric/Behavioral:  Negative for depressed mood.        Past History:  Medical History: has a past medical history of ADHD (attention deficit hyperactivity disorder), Anxiety, Dysmenorrhea, Headache, Menorrhagia (04/30/2015), Menorrhagia, Neurotic depression, and Suicidal behavior with attempted self-injury.   Surgical History: has a past surgical history that includes Tubal ligation (1997); d & c hysteroscopy endometrial ablation (04/30/2015); and laparoscopic exploration for ectopic pregnancy (04/21/2008).   Family History: family history includes Anxiety disorder in her mother and son.   Social History: reports that she quit smoking about 24 years ago. Her smoking use included cigarettes. She has a 7.50 pack-year smoking history. She has never used smokeless tobacco. She reports current alcohol use. She reports that she does not use drugs.      Current Outpatient Medications:     atomoxetine (Strattera) 80 MG capsule, Take 1 capsule by mouth Daily. (Rx by psychiary), Disp: , Rfl:     desvenlafaxine (Pristiq) 100 MG 24 hr tablet, Take 1 tablet by mouth Daily. For depression, anxiety and hot flashes., Disp: 90 tablet, Rfl: 1    hydrOXYzine pamoate (Vistaril) 25 MG capsule, Take 1-2 capsules by mouth 3 (Three) Times a Day As Needed for Anxiety., Disp: 30 capsule, Rfl: 5    rosuvastatin (Crestor) 5 MG tablet, Take 1 tablet by mouth Daily. For cholesterol, heart protection, and stroke prevention, Disp: 90 tablet, Rfl: 2    cloNIDine (Catapres) 0.2 MG tablet, Take 1 tablet by mouth every night at bedtime. For hot flash symptoms, Disp: 90 tablet, Rfl: 1    Allergies: Patient has no known allergies.    Physical Exam  Constitutional:       Appearance: She is obese.   HENT:      Head: Normocephalic.      Right Ear: External ear normal.      Left Ear: External ear normal.      Nose: Nose normal.   Eyes:      Pupils: Pupils are equal, round, and reactive to light.   Cardiovascular:      Rate and Rhythm: Normal rate  and regular rhythm.      Heart sounds: Normal heart sounds.   Pulmonary:      Effort: Pulmonary effort is normal.      Breath sounds: Normal breath sounds.   Musculoskeletal:         General: Normal range of motion.      Cervical back: Normal range of motion.   Skin:     General: Skin is warm and dry.   Neurological:      General: No focal deficit present.      Mental Status: She is alert.   Psychiatric:         Mood and Affect: Mood normal.         Behavior: Behavior normal.         Thought Content: Thought content normal.          Result Review                   Assessment and Plan  Diagnoses and all orders for this visit:    1. Severe episode of recurrent major depressive disorder, without psychotic features (Primary)  Assessment & Plan:  Patient's depression is recurrent and is severe without psychosis. Their depression is currently in partial remission and the condition is improving with treatment. This will be reassessed at the next regular appointment. F/U as described:patient will continue current medication therapy.    Orders:  -     desvenlafaxine (Pristiq) 100 MG 24 hr tablet; Take 1 tablet by mouth Daily. For depression, anxiety and hot flashes.  Dispense: 90 tablet; Refill: 1    2. Generalized anxiety disorder with panic attacks  Assessment & Plan:  Psychological condition is improving with treatment.  Medication changes per orders.  Psychological condition  will be reassessed at the next regular appointment.    Orders:  -     desvenlafaxine (Pristiq) 100 MG 24 hr tablet; Take 1 tablet by mouth Daily. For depression, anxiety and hot flashes.  Dispense: 90 tablet; Refill: 1  -     hydrOXYzine pamoate (Vistaril) 25 MG capsule; Take 1-2 capsules by mouth 3 (Three) Times a Day As Needed for Anxiety.  Dispense: 30 capsule; Refill: 5    3. Mood disorder  Assessment & Plan:  Doing well with current regimen.  See above      4. Hot flashes  Assessment & Plan:  Continue Pristiq.  Given trial with clonidine at  bedtime    Orders:  -     desvenlafaxine (Pristiq) 100 MG 24 hr tablet; Take 1 tablet by mouth Daily. For depression, anxiety and hot flashes.  Dispense: 90 tablet; Refill: 1  -     cloNIDine (Catapres) 0.2 MG tablet; Take 1 tablet by mouth every night at bedtime. For hot flash symptoms  Dispense: 90 tablet; Refill: 1    5. Attention deficit hyperactivity disorder (ADHD), combined type  Assessment & Plan:  Psychological condition is improving with treatment.  Continue current treatment regimen.  Psychological condition  will be reassessed at the next regular appointment.    Orders:  -     atomoxetine (Strattera) 80 MG capsule; Take 1 capsule by mouth Daily. (Rx by psychiary)    6. Hyperlipidemia, mixed  Assessment & Plan:  Lipid abnormalities are improving with treatment.  Pharmacotherapy as ordered.  Lipids will be reassessed in 6 months.    Orders:  -     rosuvastatin (Crestor) 5 MG tablet; Take 1 tablet by mouth Daily. For cholesterol, heart protection, and stroke prevention  Dispense: 90 tablet; Refill: 2    7. Class 1 obesity due to excess calories with serious comorbidity and body mass index (BMI) of 30.0 to 30.9 in adult  Assessment & Plan:  Patient's (Body mass index is 30.62 kg/m².) indicates that they are obese (BMI >30) with health conditions that include dyslipidemias . Weight is improving with treatment. BMI  is above average; BMI management plan is completed. We discussed portion control and increasing exercise.                      Follow Up  Return in about 6 months (around 7/17/2024) for Annual physical, Med recheck, Fasting for labs at appointment (but drink water!).    Killian Sanchez MD

## 2024-03-20 ENCOUNTER — PATIENT MESSAGE (OUTPATIENT)
Dept: FAMILY MEDICINE CLINIC | Facility: CLINIC | Age: 49
End: 2024-03-20
Payer: MEDICAID

## 2024-03-20 NOTE — TELEPHONE ENCOUNTER
From: Latoya Gibson  To: Killian Sanchez  Sent: 3/20/2024 2:32 PM EDT  Subject: Medicine     Hey how u been doing ? Well our last visit u wanted me to tell u about my trip to see my daughter it was good I stayed 2 weeks it was so good to see her .. I still need to fast so I can come give blood .. And I’m taking that new medicine u put me on for hot flashes it’s doing ok but I am still having them but I know that’s probably normal… but I do have a question I was wanting to see if u could put me on diet pills evert my weight is making me sad.. I used to take them before and they worked plz let me know hope u have a good day

## 2024-04-24 ENCOUNTER — PATIENT MESSAGE (OUTPATIENT)
Dept: FAMILY MEDICINE CLINIC | Facility: CLINIC | Age: 49
End: 2024-04-24
Payer: MEDICAID

## 2024-04-25 NOTE — TELEPHONE ENCOUNTER
From: Latoya Gibson  To: Killian Sanchez  Sent: 4/24/2024 10:02 PM EDT  Subject: Hot flashes     I wanting to know if u will put me on this medication it’s caused VEOZAH and take me off the other two just let me know thanks

## 2024-05-16 ENCOUNTER — TELEPHONE (OUTPATIENT)
Dept: FAMILY MEDICINE CLINIC | Facility: CLINIC | Age: 49
End: 2024-05-16
Payer: MEDICAID

## 2024-05-16 NOTE — TELEPHONE ENCOUNTER
Patient called regarding blood pressure. It is 169/126. Sent message to medical staff waiting on response. Please advise.

## 2024-05-24 ENCOUNTER — TELEMEDICINE (OUTPATIENT)
Dept: FAMILY MEDICINE CLINIC | Facility: CLINIC | Age: 49
End: 2024-05-24
Payer: MEDICAID

## 2024-05-24 ENCOUNTER — TELEPHONE (OUTPATIENT)
Dept: FAMILY MEDICINE CLINIC | Facility: CLINIC | Age: 49
End: 2024-05-24

## 2024-05-24 VITALS
BODY MASS INDEX: 30.66 KG/M2 | WEIGHT: 184 LBS | DIASTOLIC BLOOD PRESSURE: 80 MMHG | HEIGHT: 65 IN | SYSTOLIC BLOOD PRESSURE: 130 MMHG

## 2024-05-24 DIAGNOSIS — R23.2 HOT FLASHES: Chronic | ICD-10-CM

## 2024-05-24 DIAGNOSIS — F33.2 SEVERE EPISODE OF RECURRENT MAJOR DEPRESSIVE DISORDER, WITHOUT PSYCHOTIC FEATURES: Chronic | ICD-10-CM

## 2024-05-24 DIAGNOSIS — E78.2 HYPERLIPIDEMIA, MIXED: ICD-10-CM

## 2024-05-24 DIAGNOSIS — R03.0 ELEVATED BP WITHOUT DIAGNOSIS OF HYPERTENSION: ICD-10-CM

## 2024-05-24 DIAGNOSIS — F41.0 GENERALIZED ANXIETY DISORDER WITH PANIC ATTACKS: Primary | Chronic | ICD-10-CM

## 2024-05-24 DIAGNOSIS — E66.09 CLASS 1 OBESITY DUE TO EXCESS CALORIES WITH SERIOUS COMORBIDITY AND BODY MASS INDEX (BMI) OF 30.0 TO 30.9 IN ADULT: ICD-10-CM

## 2024-05-24 DIAGNOSIS — F41.1 GENERALIZED ANXIETY DISORDER WITH PANIC ATTACKS: Primary | Chronic | ICD-10-CM

## 2024-05-24 DIAGNOSIS — F90.2 ATTENTION DEFICIT HYPERACTIVITY DISORDER (ADHD), COMBINED TYPE: ICD-10-CM

## 2024-05-24 PROCEDURE — 99214 OFFICE O/P EST MOD 30 MIN: CPT | Performed by: FAMILY MEDICINE

## 2024-05-24 PROCEDURE — 1160F RVW MEDS BY RX/DR IN RCRD: CPT | Performed by: FAMILY MEDICINE

## 2024-05-24 PROCEDURE — 1159F MED LIST DOCD IN RCRD: CPT | Performed by: FAMILY MEDICINE

## 2024-05-24 PROCEDURE — 1126F AMNT PAIN NOTED NONE PRSNT: CPT | Performed by: FAMILY MEDICINE

## 2024-05-24 RX ORDER — CLONIDINE HYDROCHLORIDE 0.2 MG/1
0.2 TABLET ORAL
Qty: 180 TABLET | Refills: 1 | Status: SHIPPED | OUTPATIENT
Start: 2024-05-24

## 2024-05-24 RX ORDER — DESVENLAFAXINE 100 MG/1
100 TABLET, EXTENDED RELEASE ORAL DAILY
Qty: 90 TABLET | Refills: 1 | Status: SHIPPED | OUTPATIENT
Start: 2024-05-24

## 2024-05-24 RX ORDER — ROSUVASTATIN CALCIUM 5 MG/1
5 TABLET, COATED ORAL DAILY
Qty: 90 TABLET | Refills: 1 | Status: SHIPPED | OUTPATIENT
Start: 2024-05-24

## 2024-05-24 RX ORDER — HYDROXYZINE PAMOATE 50 MG/1
50 CAPSULE ORAL 3 TIMES DAILY PRN
COMMUNITY
Start: 2024-05-16

## 2024-05-24 NOTE — ASSESSMENT & PLAN NOTE
Psychological condition is improving with treatment.  Continue current treatment regimen.  Psychological condition  will be reassessed at the next regular appointment.    Continue to follow-up with psychiatry and counseling.  Psychiatry is currently managing her ADD with Strattera.  Medication list reviewed and updated

## 2024-05-24 NOTE — LETTER
May 24, 2024       Patient: Latoya Gibson   YOB: 1975   Date of Visit: 5/24/2024         To whom it may concern:       Latoya Gibson is a patient under my medical care.     Please allow her to have her dog as her emotional support animal.              Electronically signed by:        Killian Sanchez MD

## 2024-05-24 NOTE — PROGRESS NOTES
Patient was seen today through synchronous audio/video technology. Verbal consent was obtained. The patient was located at home. Vitals signs were obtained by patient and recorded.     I was located at our [facility] office for this telehealth visit.    Chief Complaint  Increased stress, follow-up after blood pressure elevation and ER visit    History of Present Illness  Latoya Gibson is a 48 y.o. female presenting via video-conference today for follow-up given increased stressors due to relationship problems with her daughter.    She has been under a lot more stress and has restarted both counseling and psychiatry follow-up.    Her psychiatrist did increase her Vistaril to 50 mg due to stress flareups but she had some spiking blood pressure problems and did have evaluation with negative workup at [facility] last week.  Her blood pressures have been better after return home and she is working on relaxation techniques and this still benefit from her dog is her emotional support animal.    She does use clonidine at nighttime for hot flash symptoms and we did discuss that if she has high spiking blood pressures with stress she can take a clonidine during the day as needed.    Overall anxiety and depression remains manageable with Pristiq along with use of clonidine at nighttime for hot flash symptoms.    Psychiatry does have her on Strattera we did discuss that this can elevate blood pressure but her blood pressure has been normal after her panic episode resolved.    She does continue on low-dose Crestor given hyperlipidemia with past labs.        The following portions of the patient's history were reviewed and updated as appropriate: allergies, current medications, past family history, past medical history, past social history, past surgical history and problem list.    Review of Systems   Constitutional:  Negative for appetite change, chills, fever and  "unexpected weight change.   HENT:  Negative for hearing loss.    Eyes:  Negative for visual disturbance.   Respiratory:  Negative for chest tightness, shortness of breath and wheezing.    Cardiovascular:  Negative for chest pain, palpitations and leg swelling.   Gastrointestinal:  Negative for abdominal pain.   Genitourinary:         See HPI.  Menopausal hot flash symptoms improved with Pristiq and clonidine at bedtime   Musculoskeletal:  Negative for arthralgias, back pain and gait problem.   Skin:  Negative for rash.   Neurological:  Negative for dizziness and headaches.   Psychiatric/Behavioral:  Negative for agitation and confusion. The patient is nervous/anxious.         See HPI       Objective  /80   Ht 165.1 cm (65\")   Wt 83.5 kg (184 lb)   BMI 30.62 kg/m²     Physical Exam  Constitutional:       General: She is not in acute distress.     Appearance: Normal appearance. She is not ill-appearing.   HENT:      Head: Normocephalic and atraumatic.      Right Ear: External ear normal.      Left Ear: External ear normal.      Nose: Nose normal.   Eyes:      Extraocular Movements: Extraocular movements intact.      Conjunctiva/sclera: Conjunctivae normal.      Pupils: Pupils are equal, round, and reactive to light.   Pulmonary:      Effort: Pulmonary effort is normal. No respiratory distress.   Musculoskeletal:         General: Normal range of motion.      Cervical back: Normal range of motion.   Skin:     Findings: No rash.   Neurological:      General: No focal deficit present.      Mental Status: She is alert and oriented to person, place, and time.   Psychiatric:         Mood and Affect: Mood normal.         Behavior: Behavior normal.         Thought Content: Thought content normal.      Comments: Anxiety and panic episodes improved.  Continues with counseling and psychiatry follow-up.           Assessment/Plan   Diagnoses and all orders for this visit:    1. Generalized anxiety disorder with panic " attacks (Primary)  Assessment & Plan:  Following with [facility]    They did adjust her vistaril to 50mg prn anxiety    Continues pristiq at 100mg and Strattera at 80mg for ADD    We did discuss that she has spiking blood pressure she may take a clonidine during the day to help.  She is continuing with regular home blood pressure checks and they have been consistently under 140/90      Orders:  -     desvenlafaxine (Pristiq) 100 MG 24 hr tablet; Take 1 tablet by mouth Daily. For depression, anxiety and hot flashes.  Dispense: 90 tablet; Refill: 1    2. Attention deficit hyperactivity disorder (ADHD), combined type  Assessment & Plan:  Psychological condition is improving with treatment.  Continue current treatment regimen.  Psychological condition  will be reassessed at the next regular appointment.    Continue to follow-up with psychiatry and counseling.  Psychiatry is currently managing her ADD with Strattera.  Medication list reviewed and updated      3. Hot flashes  Assessment & Plan:  Continue Pristiq and clonidine at bedtime for hot flash symptoms.     Orders:  -     cloNIDine (Catapres) 0.2 MG tablet; Take 1 tablet by mouth every night at bedtime. For hot flash symptoms (may also take tid prn if blood pressure is above 150/100 as directed)  Dispense: 180 tablet; Refill: 1  -     desvenlafaxine (Pristiq) 100 MG 24 hr tablet; Take 1 tablet by mouth Daily. For depression, anxiety and hot flashes.  Dispense: 90 tablet; Refill: 1    4. Severe episode of recurrent major depressive disorder, without psychotic features  Assessment & Plan:  Patient's depression is recurrent and is severe without psychosis. Their depression is currently in partial remission and the condition is improving with treatment. This will be reassessed at the next regular appointment. F/U as described:patient will continue current medication therapy.    Orders:  -     desvenlafaxine (Pristiq) 100 MG 24 hr tablet; Take 1 tablet by mouth Daily.  For depression, anxiety and hot flashes.  Dispense: 90 tablet; Refill: 1    5. Hyperlipidemia, mixed  Assessment & Plan:   Lipid abnormalities are improving with treatment    Plan:  Continue same medication/s without change.      Discussed medication dosage, use, side effects, and goals of treatment in detail.    Counseled patient on lifestyle modifications to help control hyperlipidemia.     Patient Treatment Goals:   LDL goal is under 130    Followup at the next regular appointment.    Orders:  -     rosuvastatin (Crestor) 5 MG tablet; Take 1 tablet by mouth Daily. For cholesterol, heart protection, and stroke prevention  Dispense: 90 tablet; Refill: 1    6. Elevated BP without diagnosis of hypertension  Assessment & Plan:  See above.  Reviewed good home blood pressure checks and discussed she may use clonidine as needed up to 3 times a day for blood pressure spiking during anxiety episodes.      7. Class 1 obesity due to excess calories with serious comorbidity and body mass index (BMI) of 30.0 to 30.9 in adult  Assessment & Plan:  Patient's (Body mass index is 30.62 kg/m².) indicates that they are obese (BMI >30) with health conditions that include dyslipidemias . Weight is improving with treatment. BMI  is above average; BMI management plan is completed. We discussed portion control and increasing exercise.                   Return in about 3 months (around 8/24/2024) for Med recheck.    Killian Sanchez MD

## 2024-05-24 NOTE — ASSESSMENT & PLAN NOTE
Following with [facility]    They did adjust her vistaril to 50mg prn anxiety    Continues pristiq at 100mg and Strattera at 80mg for ADD    We did discuss that she has spiking blood pressure she may take a clonidine during the day to help.  She is continuing with regular home blood pressure checks and they have been consistently under 140/90

## 2024-05-24 NOTE — TELEPHONE ENCOUNTER
"Caller: Latoya Gibson \"Latoya gibson\"    Relationship to patient: Self    Best call back number:      [phone] (Mobile)     Type of visit:  MY CHART     Requested date:  5-24-24     If rescheduling, when is the original appointment:  5-24-24 AT 3:00     Additional notes: PATIENT IS DOWN IN HER BACK AND CAN BARLEY WALK AND IS REQUESTING TO DO A VIRTUAL VISIT TODAY WITH DR KAHN     PLEASE CALL         "

## 2024-06-12 ENCOUNTER — TELEPHONE (OUTPATIENT)
Dept: FAMILY MEDICINE CLINIC | Facility: CLINIC | Age: 49
End: 2024-06-12
Payer: MEDICAID

## 2024-06-12 NOTE — TELEPHONE ENCOUNTER
I did fill out her paperwork due to her ongoing problems with returning to work.      I cannot fill out the paperwork for her  as requested. He was last seen in March and no-showed for his past two followup visits.     Paperwork is on Desire's desk.

## 2024-06-12 NOTE — TELEPHONE ENCOUNTER
"Caller: Latoya Gibson \"Latoya gibson\"    Relationship: Self    Best call back number:  [phone] (Mobile)     Who are you requesting to speak with (clinical staff, provider,  specific staff member):   CLINICAL       What was the call regarding:  PATIENT WANTS TO SEE IF THE PAPERWORK FOR THE FOOD STAMP OFFICE SHE DROPPED OFF ON MONDAY HAS BEEN FILLED OUT       PLEASE CALL   "

## 2024-06-13 ENCOUNTER — PATIENT MESSAGE (OUTPATIENT)
Dept: FAMILY MEDICINE CLINIC | Facility: CLINIC | Age: 49
End: 2024-06-13
Payer: MEDICAID

## 2024-06-13 NOTE — TELEPHONE ENCOUNTER
From: Latoya Gibson  To: Killian Sanchez  Sent: 6/13/2024 1:08 PM EDT  Subject: Paper work for food stamps     Good afternoon Killian so I dropped off mine and my ’s paperwork on Monday ,, I really need that by Friday so they don’t cut my food stamps off .. Thanks hope u have a good day

## 2024-06-13 NOTE — TELEPHONE ENCOUNTER
CALLED PATIENT LET HER KNOW PAPER WORK WAS READY AND UNABLE TO FILL OUT PAPER WORK FOR   [employer] AND LEFT A MESSAGE

## 2024-06-20 ENCOUNTER — PATIENT MESSAGE (OUTPATIENT)
Dept: FAMILY MEDICINE CLINIC | Facility: CLINIC | Age: 49
End: 2024-06-20
Payer: MEDICAID

## 2024-06-20 NOTE — TELEPHONE ENCOUNTER
From: Latoya Gibson  To: Killian Sanchez  Sent: 6/20/2024 1:02 PM EDT  Subject: Please if u can help my friend     Good Afternoon so I have a real good friend that is looking for a good Dr and told her about you , so she is about like me lol if u can get her as soon as possible cause she haven’t been taking her medicine and I don’t want to relapse… Thanks I hope u have a good day .. here is her phone number and her name is Derek Valentin [phone]

## 2024-07-18 ENCOUNTER — PATIENT MESSAGE (OUTPATIENT)
Dept: FAMILY MEDICINE CLINIC | Facility: CLINIC | Age: 49
End: 2024-07-18

## 2024-07-24 NOTE — TELEPHONE ENCOUNTER
From: Latoya Gibson  To: Killian Sanchez  Sent: 7/18/2024 2:49 PM EDT  Subject: My appointment     Good afternoon sorry I missed my appointment but I am down in my back … I called and made another appointment but it’s in Sept but I guess if I need refills I will just let u know .. hope u have a good day and also I wanted to thank you so much for seeing my friend.

## 2024-08-08 DIAGNOSIS — R23.2 HOT FLASHES: Chronic | ICD-10-CM

## 2024-08-08 RX ORDER — CLONIDINE HYDROCHLORIDE 0.2 MG/1
TABLET ORAL
Qty: 90 TABLET | Refills: 0 | Status: SHIPPED | OUTPATIENT
Start: 2024-08-08

## 2024-09-12 ENCOUNTER — OFFICE VISIT (OUTPATIENT)
Dept: FAMILY MEDICINE CLINIC | Facility: CLINIC | Age: 49
End: 2024-09-12
Payer: MEDICAID

## 2024-09-12 VITALS
OXYGEN SATURATION: 100 % | WEIGHT: 197.4 LBS | BODY MASS INDEX: 32.89 KG/M2 | SYSTOLIC BLOOD PRESSURE: 118 MMHG | HEIGHT: 65 IN | DIASTOLIC BLOOD PRESSURE: 70 MMHG | HEART RATE: 72 BPM

## 2024-09-12 DIAGNOSIS — Z13.1 DIABETES MELLITUS SCREENING: ICD-10-CM

## 2024-09-12 DIAGNOSIS — F41.0 GENERALIZED ANXIETY DISORDER WITH PANIC ATTACKS: Chronic | ICD-10-CM

## 2024-09-12 DIAGNOSIS — E53.8 VITAMIN B12 DEFICIENCY: ICD-10-CM

## 2024-09-12 DIAGNOSIS — E55.9 VITAMIN D DEFICIENCY: ICD-10-CM

## 2024-09-12 DIAGNOSIS — Z12.31 SCREENING MAMMOGRAM FOR BREAST CANCER: ICD-10-CM

## 2024-09-12 DIAGNOSIS — R23.2 HOT FLASHES: Chronic | ICD-10-CM

## 2024-09-12 DIAGNOSIS — E78.2 HYPERLIPIDEMIA, MIXED: ICD-10-CM

## 2024-09-12 DIAGNOSIS — F90.2 ATTENTION DEFICIT HYPERACTIVITY DISORDER (ADHD), COMBINED TYPE: Chronic | ICD-10-CM

## 2024-09-12 DIAGNOSIS — F33.2 SEVERE EPISODE OF RECURRENT MAJOR DEPRESSIVE DISORDER, WITHOUT PSYCHOTIC FEATURES: Chronic | ICD-10-CM

## 2024-09-12 DIAGNOSIS — E66.09 CLASS 1 OBESITY DUE TO EXCESS CALORIES WITH SERIOUS COMORBIDITY AND BODY MASS INDEX (BMI) OF 32.0 TO 32.9 IN ADULT: ICD-10-CM

## 2024-09-12 DIAGNOSIS — Z00.00 GENERAL MEDICAL EXAM: Primary | ICD-10-CM

## 2024-09-12 DIAGNOSIS — F41.1 GENERALIZED ANXIETY DISORDER WITH PANIC ATTACKS: Chronic | ICD-10-CM

## 2024-09-12 PROCEDURE — 99396 PREV VISIT EST AGE 40-64: CPT | Performed by: FAMILY MEDICINE

## 2024-09-12 PROCEDURE — 1159F MED LIST DOCD IN RCRD: CPT | Performed by: FAMILY MEDICINE

## 2024-09-12 PROCEDURE — 1160F RVW MEDS BY RX/DR IN RCRD: CPT | Performed by: FAMILY MEDICINE

## 2024-09-12 PROCEDURE — 1126F AMNT PAIN NOTED NONE PRSNT: CPT | Performed by: FAMILY MEDICINE

## 2024-09-12 RX ORDER — CLONIDINE HYDROCHLORIDE 0.2 MG/1
0.2 TABLET ORAL NIGHTLY
Qty: 90 TABLET | Refills: 2 | Status: SHIPPED | OUTPATIENT
Start: 2024-09-12

## 2024-09-12 RX ORDER — ROSUVASTATIN CALCIUM 5 MG/1
5 TABLET, COATED ORAL DAILY
Qty: 90 TABLET | Refills: 2 | Status: SHIPPED | OUTPATIENT
Start: 2024-09-12

## 2024-09-12 RX ORDER — DESVENLAFAXINE 100 MG/1
100 TABLET, EXTENDED RELEASE ORAL DAILY
Qty: 90 TABLET | Refills: 2 | Status: SHIPPED | OUTPATIENT
Start: 2024-09-12

## 2024-09-12 NOTE — ASSESSMENT & PLAN NOTE
Patient's (Body mass index is 32.85 kg/m².) indicates that they are obese (BMI >30) with health conditions that include dyslipidemias . Weight is improving with treatment. BMI  is above average; BMI management plan is completed. We discussed portion control and increasing exercise.

## 2024-09-12 NOTE — ASSESSMENT & PLAN NOTE
Discussed together health maintenance and screening along with vaccination options and healthy diet and exercise habits as part of the preventative counseling at their physical exam today.     Declines vaccination updates.    Encouraged past-due mammogram

## 2024-09-12 NOTE — PROGRESS NOTES
"Chief Complaint  Physical     Subjective    History of Present Illness:  Latoya Gibson is a 48 y.o. female who presents today for physical exam  And medication refills    Overall doing well since our last visit together.    She is following here and with psychiatry for severe recurrent depression, anxiety, and ADD    She has been under a lot more stress due to relationship strains with her daughter...and has restarted both counseling and psychiatry follow-up.    Her psychiatrist did increase her Vistaril to 50 mg due to stress flareups  and she is working on relaxation techniques and this still benefit from her dog is her emotional support animal.    She does use clonidine at nighttime for hot flash symptoms and we did discuss that if she has high spiking blood pressures with stress she can take a clonidine during the day as needed.    Overall anxiety and depression remains manageable with Pristiq along with use of clonidine at nighttime for hot flash symptoms.    Psychiatry does have her on Strattera we did discuss that this can elevate blood pressure but her blood pressure has been normal after her panic episode resolved.    She does continue on low-dose Crestor given hyperlipidemia with past labs.    Colonoscopy up-to-date 1/23/2020 with no polyps and plan to repeat in 10 years (January 2030) for screening.    PAP smear 8/29/23.  Plan for repeat in Aug 2026.     Declines COVID vaccination.  Declines flu shot    Encouraged past-due mammogram and she plans to get this up-to-date at [facility]      Objective   Vital Signs:   /70 (BP Location: Left arm, Patient Position: Sitting, Cuff Size: Adult)   Pulse 72   Ht 165.1 cm (65\")   Wt 89.5 kg (197 lb 6.4 oz)   SpO2 100%   BMI 32.85 kg/m²     Review of Systems   Constitutional:  Negative for appetite change, chills and fever.   HENT:  Negative for hearing loss.    Eyes:  Negative for blurred vision.   Respiratory:  Negative for chest " tightness.    Cardiovascular:  Negative for chest pain.   Gastrointestinal:  Negative for abdominal pain.   Musculoskeletal:  Negative for gait problem.   Skin:  Negative for rash.   Psychiatric/Behavioral:  Positive for depressed mood. Negative for suicidal ideas. The patient is nervous/anxious.         See HPI.  Doing well with current regimen from behavioral health and from our office       Past History:  Medical History: has a past medical history of ADHD (attention deficit hyperactivity disorder), Anxiety, Arthritis, Diverticulosis, Dysmenorrhea, Headache, Menorrhagia (04/30/2015), Menorrhagia, Neurotic depression, and Suicidal behavior with attempted self-injury.   Surgical History: has a past surgical history that includes Tubal ligation (1997); d & c hysteroscopy endometrial ablation (04/30/2015); laparoscopic exploration for ectopic pregnancy (04/21/2008); Colon surgery; and Colonoscopy.   Family History: family history includes Alcohol abuse in her father; Anxiety disorder in her mother and son; COPD in her mother; Depression in her mother.   Social History: reports that she has been smoking cigarettes. She started smoking about 39 years ago. She has a 7.5 pack-year smoking history. She has never used smokeless tobacco. She reports current alcohol use. She reports that she does not use drugs.      Current Outpatient Medications:     atomoxetine (Strattera) 80 MG capsule, Take 1 capsule by mouth Daily. (Rx by psychiary), Disp: , Rfl:     cloNIDine (CATAPRES) 0.2 MG tablet, Take 1 tablet by mouth Every Night., Disp: 90 tablet, Rfl: 2    desvenlafaxine (Pristiq) 100 MG 24 hr tablet, Take 1 tablet by mouth Daily. For depression, anxiety and hot flashes., Disp: 90 tablet, Rfl: 2    hydrOXYzine pamoate (VISTARIL) 50 MG capsule, Take 1 capsule by mouth 3 (Three) Times a Day As Needed for Anxiety., Disp: , Rfl:     rosuvastatin (Crestor) 5 MG tablet, Take 1 tablet by mouth Daily. For cholesterol, heart protection,  and stroke prevention, Disp: 90 tablet, Rfl: 2    Allergies: Patient has no known allergies.    Physical Exam  Constitutional:       Appearance: She is obese.   HENT:      Head: Normocephalic.      Right Ear: Tympanic membrane, ear canal and external ear normal.      Left Ear: Tympanic membrane, ear canal and external ear normal.      Nose: Nose normal.      Mouth/Throat:      Mouth: Mucous membranes are moist.      Pharynx: Oropharynx is clear.   Eyes:      Extraocular Movements: Extraocular movements intact.      Conjunctiva/sclera: Conjunctivae normal.      Pupils: Pupils are equal, round, and reactive to light.   Cardiovascular:      Rate and Rhythm: Normal rate and regular rhythm.      Heart sounds: Normal heart sounds. No murmur heard.     No friction rub. No gallop.   Pulmonary:      Effort: Pulmonary effort is normal.      Breath sounds: Normal breath sounds.   Musculoskeletal:         General: Normal range of motion.      Cervical back: Normal range of motion.   Skin:     General: Skin is warm and dry.   Neurological:      General: No focal deficit present.      Mental Status: She is alert.   Psychiatric:         Mood and Affect: Mood normal.         Behavior: Behavior normal.         Thought Content: Thought content normal.          Result Review                   Assessment and Plan  Diagnoses and all orders for this visit:    1. General medical exam (Primary)  Assessment & Plan:  Discussed together health maintenance and screening along with vaccination options and healthy diet and exercise habits as part of the preventative counseling at their physical exam today.     Declines vaccination updates.    Encouraged past-due mammogram    Orders:  -     CBC Auto Differential; Future  -     Comprehensive Metabolic Panel; Future  -     Lipid Panel; Future  -     TSH; Future  -     T4, Free; Future  -     CBC Auto Differential  -     Cancel: Comprehensive Metabolic Panel  -     Cancel: Lipid Panel  -     TSH  -      T4, Free    2. Diabetes mellitus screening  -     Hemoglobin A1c; Future  -     Hemoglobin A1c    3. Hot flashes  Assessment & Plan:  Continue Pristiq and clonidine at bedtime for hot flash symptoms.     Orders:  -     cloNIDine (CATAPRES) 0.2 MG tablet; Take 1 tablet by mouth Every Night.  Dispense: 90 tablet; Refill: 2  -     desvenlafaxine (Pristiq) 100 MG 24 hr tablet; Take 1 tablet by mouth Daily. For depression, anxiety and hot flashes.  Dispense: 90 tablet; Refill: 2    4. Generalized anxiety disorder with panic attacks  Assessment & Plan:  Following with [facility]    They did adjust her vistaril to 50mg prn anxiety    Continues pristiq at 100mg and Strattera at 80mg for ADD    We did discuss that she has spiking blood pressure she may take a clonidine during the day to help.  She is continuing with regular home blood pressure checks and they have been consistently under 140/90      Orders:  -     desvenlafaxine (Pristiq) 100 MG 24 hr tablet; Take 1 tablet by mouth Daily. For depression, anxiety and hot flashes.  Dispense: 90 tablet; Refill: 2    5. Severe episode of recurrent major depressive disorder, without psychotic features  Assessment & Plan:  Patient's depression is recurrent and is severe without psychosis. Their depression is currently in partial remission and the condition is improving with treatment. This will be reassessed at the next regular appointment. F/U as described:patient will continue current medication therapy.    Orders:  -     desvenlafaxine (Pristiq) 100 MG 24 hr tablet; Take 1 tablet by mouth Daily. For depression, anxiety and hot flashes.  Dispense: 90 tablet; Refill: 2    6. Hyperlipidemia, mixed  Assessment & Plan:   Lipid abnormalities are improving with treatment    Plan:  Continue same medication/s without change.      Discussed medication dosage, use, side effects, and goals of treatment in detail.    Counseled patient on lifestyle modifications to help control  hyperlipidemia.     Patient Treatment Goals:   LDL goal is under 130    Followup at the next regular appointment.    Orders:  -     rosuvastatin (Crestor) 5 MG tablet; Take 1 tablet by mouth Daily. For cholesterol, heart protection, and stroke prevention  Dispense: 90 tablet; Refill: 2  -     Comprehensive Metabolic Panel  -     Lipid Panel    7. Vitamin B12 deficiency  -     Vitamin B12; Future  -     Vitamin B12    8. Vitamin D deficiency  -     Vitamin D,25-Hydroxy; Future  -     Vitamin D,25-Hydroxy    9. Screening mammogram for breast cancer  Assessment & Plan:  Plans for screening mamm[facility] fall      10. Attention deficit hyperactivity disorder (ADHD), combined type  Assessment & Plan:  Psychological condition is improving with treatment.  Continue current treatment regimen.  Psychological condition  will be reassessed at the next regular appointment.    Continue to follow-up with psychiatry and counseling.  Psychiatry is currently managing her ADD with Strattera.  Medication list reviewed and updated      11. Class 1 obesity due to excess calories with serious comorbidity and body mass index (BMI) of 32.0 to 32.9 in adult  Assessment & Plan:  Patient's (Body mass index is 32.85 kg/m².) indicates that they are obese (BMI >30) with health conditions that include dyslipidemias . Weight is improving with treatment. BMI  is above average; BMI management plan is completed. We discussed portion control and increasing exercise.                      Follow Up  Return in about 6 months (around 3/12/2025) for Med recheck.    Killian Sanchez MD

## 2024-09-13 LAB
25(OH)D3+25(OH)D2 SERPL-MCNC: 38.3 NG/ML (ref 30–100)
ALBUMIN SERPL-MCNC: 5 G/DL (ref 3.9–4.9)
ALP SERPL-CCNC: 98 IU/L (ref 44–121)
ALT SERPL-CCNC: 41 IU/L (ref 0–32)
AST SERPL-CCNC: 47 IU/L (ref 0–40)
BASOPHILS # BLD AUTO: 0.1 X10E3/UL (ref 0–0.2)
BASOPHILS NFR BLD AUTO: 1 %
BILIRUB SERPL-MCNC: 0.2 MG/DL (ref 0–1.2)
BUN SERPL-MCNC: 16 MG/DL (ref 6–24)
BUN/CREAT SERPL: 24 (ref 9–23)
CALCIUM SERPL-MCNC: 9.8 MG/DL (ref 8.7–10.2)
CHLORIDE SERPL-SCNC: 101 MMOL/L (ref 96–106)
CHOLEST SERPL-MCNC: 245 MG/DL (ref 100–199)
CO2 SERPL-SCNC: 22 MMOL/L (ref 20–29)
CREAT SERPL-MCNC: 0.68 MG/DL (ref 0.57–1)
EGFRCR SERPLBLD CKD-EPI 2021: 107 ML/MIN/1.73
EOSINOPHIL # BLD AUTO: 0.2 X10E3/UL (ref 0–0.4)
EOSINOPHIL NFR BLD AUTO: 3 %
ERYTHROCYTE [DISTWIDTH] IN BLOOD BY AUTOMATED COUNT: 14.2 % (ref 11.7–15.4)
GLOBULIN SER CALC-MCNC: 2.4 G/DL (ref 1.5–4.5)
GLUCOSE SERPL-MCNC: 97 MG/DL (ref 70–99)
HBA1C MFR BLD: 5.8 % (ref 4.8–5.6)
HCT VFR BLD AUTO: 41.3 % (ref 34–46.6)
HDLC SERPL-MCNC: 91 MG/DL
HGB BLD-MCNC: 13.5 G/DL (ref 11.1–15.9)
IMM GRANULOCYTES # BLD AUTO: 0 X10E3/UL (ref 0–0.1)
IMM GRANULOCYTES NFR BLD AUTO: 0 %
LDLC SERPL CALC-MCNC: 120 MG/DL (ref 0–99)
LYMPHOCYTES # BLD AUTO: 2.4 X10E3/UL (ref 0.7–3.1)
LYMPHOCYTES NFR BLD AUTO: 40 %
MCH RBC QN AUTO: 31.8 PG (ref 26.6–33)
MCHC RBC AUTO-ENTMCNC: 32.7 G/DL (ref 31.5–35.7)
MCV RBC AUTO: 97 FL (ref 79–97)
MONOCYTES # BLD AUTO: 0.5 X10E3/UL (ref 0.1–0.9)
MONOCYTES NFR BLD AUTO: 9 %
NEUTROPHILS # BLD AUTO: 2.8 X10E3/UL (ref 1.4–7)
NEUTROPHILS NFR BLD AUTO: 47 %
PLATELET # BLD AUTO: 229 X10E3/UL (ref 150–450)
POTASSIUM SERPL-SCNC: 4.5 MMOL/L (ref 3.5–5.2)
PROT SERPL-MCNC: 7.4 G/DL (ref 6–8.5)
RBC # BLD AUTO: 4.24 X10E6/UL (ref 3.77–5.28)
SODIUM SERPL-SCNC: 139 MMOL/L (ref 134–144)
T4 FREE SERPL-MCNC: 0.92 NG/DL (ref 0.82–1.77)
TRIGL SERPL-MCNC: 202 MG/DL (ref 0–149)
TSH SERPL DL<=0.005 MIU/L-ACNC: 4.21 UIU/ML (ref 0.45–4.5)
VIT B12 SERPL-MCNC: 375 PG/ML (ref 232–1245)
VLDLC SERPL CALC-MCNC: 34 MG/DL (ref 5–40)
WBC # BLD AUTO: 6 X10E3/UL (ref 3.4–10.8)

## 2024-09-14 ENCOUNTER — TELEPHONE (OUTPATIENT)
Dept: FAMILY MEDICINE CLINIC | Facility: CLINIC | Age: 49
End: 2024-09-14
Payer: MEDICAID

## 2024-09-14 NOTE — TELEPHONE ENCOUNTER
HUB TO RELAY    Michaello my name is KARLIE Leonard, I am covering for Dr. Sanchez while he is out for couple of days.  We received your blood work which showed that your A1c was 5.8 which puts you barely into the prediabetic range I would suggest that you reduce carbs and sweets in your diet and try to get daily exercise to prevent progressing to diabetes.  Your kidney function was fine you did have a couple of liver tests that were slightly out of normal range but not significant enough that I be concerned about this but I would recommend you contact Dr. Sanchez and ask if you need repeat liver function testing in about a month.  Your cholesterol has improved and your LDL is now 120 which does put it below the recommended range of 130.  Thyroid test, vitamin B12 and vitamin D were all normal.  Complete blood count was normal.

## 2024-09-16 NOTE — TELEPHONE ENCOUNTER
"HUB TO RELAY     Hello my name is KARLIE Leonard, I am covering for Dr. Kahn while he is out for couple of days.  We received your blood work which showed that your A1c was 5.8 which puts you barely into the prediabetic range I would suggest that you reduce carbs and sweets in your diet and try to get daily exercise to prevent progressing to diabetes.  Your kidney function was fine you did have a couple of liver tests that were slightly out of normal range but not significant enough that I be concerned about this but I would recommend you contact Dr. Kahn and ask if you need repeat liver function testing in about a month.  Your cholesterol has improved and your LDL is now 120 which does put it below the recommended range of 130.  Thyroid test, vitamin B12 and vitamin D were all normal.  Complete blood count was normal.           Name: Latoya Gregorio \"Latoya gregorio\"    Relationship: Self    Best Callback Number: [phone]     HUB PROVIDED THE RELAY MESSAGE FROM THE OFFICE   PATIENT HAS FURTHER QUESTIONS AND WOULD LIKE A CALL BACK AT THE FOLLOWING PHONE NUMBER [phone].  SHE IS CONCERNED ABOUT LIVER FUNCTION.  WHEN WILL DR KAHN BE BACK?  SHE WOULD LIKE TO RECHECK IN A MONTH.      ADDITIONAL INFORMATION:    "

## 2024-09-27 ENCOUNTER — TELEPHONE (OUTPATIENT)
Dept: FAMILY MEDICINE CLINIC | Facility: CLINIC | Age: 49
End: 2024-09-27
Payer: MEDICAID

## 2024-10-30 ENCOUNTER — TELEPHONE (OUTPATIENT)
Dept: CARDIOLOGY | Facility: CLINIC | Age: 49
End: 2024-10-30
Payer: MEDICAID

## 2024-11-13 ENCOUNTER — PATIENT MESSAGE (OUTPATIENT)
Dept: FAMILY MEDICINE CLINIC | Facility: CLINIC | Age: 49
End: 2024-11-13
Payer: MEDICAID

## 2024-11-13 DIAGNOSIS — J30.1 SEASONAL ALLERGIC RHINITIS DUE TO POLLEN: Primary | ICD-10-CM

## 2024-11-13 DIAGNOSIS — E78.2 HYPERLIPIDEMIA, MIXED: ICD-10-CM

## 2024-11-13 DIAGNOSIS — R73.9 HYPERGLYCEMIA: ICD-10-CM

## 2024-11-13 RX ORDER — LEVOCETIRIZINE DIHYDROCHLORIDE 5 MG/1
5 TABLET, FILM COATED ORAL EVERY EVENING
Qty: 30 TABLET | Refills: 5 | Status: SHIPPED | OUTPATIENT
Start: 2024-11-13

## 2024-11-13 RX ORDER — FLUTICASONE PROPIONATE 50 MCG
2 SPRAY, SUSPENSION (ML) NASAL DAILY
Qty: 16 G | Refills: 5 | Status: SHIPPED | OUTPATIENT
Start: 2024-11-13

## 2024-11-19 ENCOUNTER — TELEPHONE (OUTPATIENT)
Dept: FAMILY MEDICINE CLINIC | Facility: CLINIC | Age: 49
End: 2024-11-19
Payer: MEDICAID

## 2024-11-19 NOTE — TELEPHONE ENCOUNTER
Caller: KELSIE    Relationship: [employer]    Best call back number: [phone]    What form or medical record are you requesting: PAPERWORK FOR [employer]    Who is requesting this form or medical record from you: [employer]    How would you like to receive the form or medical records (pick-up, mail, fax): FAX  If fax, what is the fax number: [phone]    Timeframe paperwork needed: AS SOON AS     Additional notes: THIS PAPERWORK WAS FAXED TO THE OFFICE ON LAST FRIDAY 11/15/2024

## 2024-11-20 ENCOUNTER — PATIENT MESSAGE (OUTPATIENT)
Dept: FAMILY MEDICINE CLINIC | Facility: CLINIC | Age: 49
End: 2024-11-20
Payer: MEDICAID

## 2024-11-20 DIAGNOSIS — Z12.11 SCREENING FOR COLON CANCER: Primary | ICD-10-CM

## 2024-12-12 NOTE — TELEPHONE ENCOUNTER
Called [employer] and they will refax this paperwork as we have not received anything.

## 2024-12-12 NOTE — TELEPHONE ENCOUNTER
Caller: KELSIE     Relationship: [employer]     Best call back number: [phone]     What is the best time to reach you: ANYTIME     Who are you requesting to speak with (clinical staff, provider,  specific staff member): CLINICAL        What was the call regarding: KELSIE CALLED IN TO CHECK ON THE STATUS OF THE PAPERWORK THAT WAS FAXED OVER ON 11/15/2024. PLEASE CALL HER BACK WITH AN UPDATE.